# Patient Record
Sex: MALE | Race: BLACK OR AFRICAN AMERICAN | Employment: FULL TIME | URBAN - METROPOLITAN AREA
[De-identification: names, ages, dates, MRNs, and addresses within clinical notes are randomized per-mention and may not be internally consistent; named-entity substitution may affect disease eponyms.]

---

## 2021-01-19 PROCEDURE — 2709999900 HC NON-CHARGEABLE SUPPLY

## 2021-01-20 ENCOUNTER — APPOINTMENT (OUTPATIENT)
Dept: GENERAL RADIOLOGY | Age: 46
DRG: 638 | End: 2021-01-20
Attending: EMERGENCY MEDICINE
Payer: COMMERCIAL

## 2021-01-20 ENCOUNTER — HOSPITAL ENCOUNTER (INPATIENT)
Age: 46
LOS: 2 days | Discharge: HOME | DRG: 638 | End: 2021-01-23
Attending: EMERGENCY MEDICINE | Admitting: INTERNAL MEDICINE
Payer: COMMERCIAL

## 2021-01-20 DIAGNOSIS — K59.00 CONSTIPATION, UNSPECIFIED CONSTIPATION TYPE: ICD-10-CM

## 2021-01-20 DIAGNOSIS — R10.9 ABDOMINAL DISCOMFORT: ICD-10-CM

## 2021-01-20 DIAGNOSIS — E11.10 DIABETIC KETOACIDOSIS WITHOUT COMA ASSOCIATED WITH TYPE 2 DIABETES MELLITUS (HCC): ICD-10-CM

## 2021-01-20 DIAGNOSIS — E83.39 HYPOPHOSPHATEMIA: ICD-10-CM

## 2021-01-20 LAB
ARTERIAL PATENCY WRIST A: ABNORMAL
BASE DEFICIT BLDV-SCNC: 24 MMOL/L
BDY SITE: ABNORMAL
BODY TEMPERATURE: 98.3
GAS FLOW.O2 O2 DELIVERY SYS: ABNORMAL L/MIN
GLUCOSE BLD STRIP.AUTO-MCNC: 421 MG/DL (ref 70–110)
GLUCOSE BLD STRIP.AUTO-MCNC: 423 MG/DL (ref 70–110)
HCO3 BLDV-SCNC: 4.8 MMOL/L (ref 23–28)
LACTATE BLD-SCNC: 2.89 MMOL/L (ref 0.4–2)
PCO2 BLDV: 18.3 MMHG (ref 41–51)
PH BLDV: 7.03 [PH] (ref 7.32–7.42)
PO2 BLDV: 44 MMHG (ref 25–40)
SAO2 % BLDV: 60 % (ref 65–88)
SERVICE CMNT-IMP: ABNORMAL
SPECIMEN TYPE: ABNORMAL
TOTAL RESP. RATE, ITRR: 31

## 2021-01-20 PROCEDURE — 71045 X-RAY EXAM CHEST 1 VIEW: CPT

## 2021-01-20 PROCEDURE — 83735 ASSAY OF MAGNESIUM: CPT

## 2021-01-20 PROCEDURE — 80053 COMPREHEN METABOLIC PANEL: CPT

## 2021-01-20 PROCEDURE — 96375 TX/PRO/DX INJ NEW DRUG ADDON: CPT

## 2021-01-20 PROCEDURE — 99285 EMERGENCY DEPT VISIT HI MDM: CPT

## 2021-01-20 PROCEDURE — 84100 ASSAY OF PHOSPHORUS: CPT

## 2021-01-20 PROCEDURE — 82962 GLUCOSE BLOOD TEST: CPT

## 2021-01-20 PROCEDURE — 83605 ASSAY OF LACTIC ACID: CPT

## 2021-01-20 PROCEDURE — 74011250636 HC RX REV CODE- 250/636: Performed by: EMERGENCY MEDICINE

## 2021-01-20 PROCEDURE — 85025 COMPLETE CBC W/AUTO DIFF WBC: CPT

## 2021-01-20 PROCEDURE — 93005 ELECTROCARDIOGRAM TRACING: CPT

## 2021-01-20 PROCEDURE — 83036 HEMOGLOBIN GLYCOSYLATED A1C: CPT

## 2021-01-20 PROCEDURE — 87040 BLOOD CULTURE FOR BACTERIA: CPT

## 2021-01-20 PROCEDURE — 82803 BLOOD GASES ANY COMBINATION: CPT

## 2021-01-20 PROCEDURE — 96361 HYDRATE IV INFUSION ADD-ON: CPT

## 2021-01-20 RX ORDER — SODIUM CHLORIDE 0.9 % (FLUSH) 0.9 %
5-10 SYRINGE (ML) INJECTION AS NEEDED
Status: DISCONTINUED | OUTPATIENT
Start: 2021-01-20 | End: 2021-01-23 | Stop reason: HOSPADM

## 2021-01-20 RX ORDER — METFORMIN HYDROCHLORIDE 500 MG/1
500 TABLET ORAL 2 TIMES DAILY WITH MEALS
COMMUNITY
End: 2021-01-23

## 2021-01-20 RX ADMIN — SODIUM CHLORIDE 3000 ML: 900 INJECTION, SOLUTION INTRAVENOUS at 23:21

## 2021-01-21 ENCOUNTER — APPOINTMENT (OUTPATIENT)
Dept: GENERAL RADIOLOGY | Age: 46
DRG: 638 | End: 2021-01-21
Attending: PHYSICIAN ASSISTANT
Payer: COMMERCIAL

## 2021-01-21 ENCOUNTER — APPOINTMENT (OUTPATIENT)
Dept: ULTRASOUND IMAGING | Age: 46
DRG: 638 | End: 2021-01-21
Attending: PHYSICIAN ASSISTANT
Payer: COMMERCIAL

## 2021-01-21 PROBLEM — E11.10 DKA (DIABETIC KETOACIDOSES): Status: ACTIVE | Noted: 2021-01-21

## 2021-01-21 LAB
ADMINISTERED INITIALS, ADMINIT: NORMAL
ALBUMIN SERPL-MCNC: 3.2 G/DL (ref 3.4–5)
ALBUMIN SERPL-MCNC: 3.3 G/DL (ref 3.4–5)
ALBUMIN SERPL-MCNC: 4.4 G/DL (ref 3.4–5)
ALBUMIN/GLOB SERPL: 0.8 {RATIO} (ref 0.8–1.7)
ALP SERPL-CCNC: 95 U/L (ref 45–117)
ALT SERPL-CCNC: 22 U/L (ref 16–61)
AMORPH CRY URNS QL MICRO: ABNORMAL
AMYLASE SERPL-CCNC: 41 U/L (ref 25–115)
ANION GAP SERPL CALC-SCNC: 13 MMOL/L (ref 3–18)
ANION GAP SERPL CALC-SCNC: 14 MMOL/L (ref 3–18)
ANION GAP SERPL CALC-SCNC: 24 MMOL/L (ref 3–18)
ANION GAP SERPL CALC-SCNC: 27 MMOL/L (ref 3–18)
APPEARANCE UR: CLEAR
ARTERIAL PATENCY WRIST A: ABNORMAL
AST SERPL-CCNC: 17 U/L (ref 10–38)
ATRIAL RATE: 125 BPM
B PERT DNA SPEC QL NAA+PROBE: NOT DETECTED
BACTERIA URNS QL MICRO: NEGATIVE /HPF
BASE DEFICIT BLDV-SCNC: 27 MMOL/L
BASOPHILS # BLD: 0 K/UL (ref 0–0.06)
BASOPHILS # BLD: 0 K/UL (ref 0–0.1)
BASOPHILS NFR BLD: 0 % (ref 0–3)
BASOPHILS NFR BLD: 0 % (ref 0–3)
BDY SITE: ABNORMAL
BILIRUB SERPL-MCNC: 1 MG/DL (ref 0.2–1)
BILIRUB UR QL: NEGATIVE
BODY TEMPERATURE: 97.8
BORDETELLA PARAPERTUSSIS PCR, BORPAR: NOT DETECTED
BUN SERPL-MCNC: 10 MG/DL (ref 7–18)
BUN SERPL-MCNC: 16 MG/DL (ref 7–18)
BUN SERPL-MCNC: 18 MG/DL (ref 7–18)
BUN SERPL-MCNC: 7 MG/DL (ref 7–18)
BUN/CREAT SERPL: 10 (ref 12–20)
BUN/CREAT SERPL: 11 (ref 12–20)
BUN/CREAT SERPL: 5 (ref 12–20)
BUN/CREAT SERPL: 8 (ref 12–20)
C PNEUM DNA SPEC QL NAA+PROBE: NOT DETECTED
CALCIUM SERPL-MCNC: 7.5 MG/DL (ref 8.5–10.1)
CALCIUM SERPL-MCNC: 7.5 MG/DL (ref 8.5–10.1)
CALCIUM SERPL-MCNC: 7.6 MG/DL (ref 8.5–10.1)
CALCIUM SERPL-MCNC: 9.1 MG/DL (ref 8.5–10.1)
CALCULATED P AXIS, ECG09: 57 DEGREES
CALCULATED R AXIS, ECG10: 1 DEGREES
CALCULATED T AXIS, ECG11: 59 DEGREES
CHLORIDE SERPL-SCNC: 104 MMOL/L (ref 100–111)
CHLORIDE SERPL-SCNC: 107 MMOL/L (ref 100–111)
CHLORIDE SERPL-SCNC: 111 MMOL/L (ref 100–111)
CHLORIDE SERPL-SCNC: 91 MMOL/L (ref 100–111)
CK MB CFR SERPL CALC: 0.4 % (ref 0–4)
CK MB SERPL-MCNC: 1.3 NG/ML (ref 5–25)
CK SERPL-CCNC: 294 U/L (ref 39–308)
CO2 SERPL-SCNC: 11 MMOL/L (ref 21–32)
CO2 SERPL-SCNC: 16 MMOL/L (ref 21–32)
CO2 SERPL-SCNC: 6 MMOL/L (ref 21–32)
CO2 SERPL-SCNC: 7 MMOL/L (ref 21–32)
COLOR UR: YELLOW
CREAT SERPL-MCNC: 1.29 MG/DL (ref 0.6–1.3)
CREAT SERPL-MCNC: 1.31 MG/DL (ref 0.6–1.3)
CREAT SERPL-MCNC: 1.45 MG/DL (ref 0.6–1.3)
CREAT SERPL-MCNC: 1.86 MG/DL (ref 0.6–1.3)
D50 ADMINISTERED, D50ADM: 0 ML
D50 ORDER, D50ORD: 0 ML
DIAGNOSIS, 93000: NORMAL
DIFFERENTIAL METHOD BLD: ABNORMAL
DIFFERENTIAL METHOD BLD: ABNORMAL
EOSINOPHIL # BLD: 0 K/UL (ref 0–0.4)
EOSINOPHIL # BLD: 0 K/UL (ref 0–0.4)
EOSINOPHIL NFR BLD: 0 % (ref 0–5)
EOSINOPHIL NFR BLD: 0 % (ref 0–5)
EPITH CASTS URNS QL MICRO: ABNORMAL /LPF (ref 0–5)
ERYTHROCYTE [DISTWIDTH] IN BLOOD BY AUTOMATED COUNT: 14.3 % (ref 11.6–14.5)
ERYTHROCYTE [DISTWIDTH] IN BLOOD BY AUTOMATED COUNT: 15.1 % (ref 11.6–14.5)
EST. AVERAGE GLUCOSE BLD GHB EST-MCNC: ABNORMAL MG/DL
FLUAV SUBTYP SPEC NAA+PROBE: NOT DETECTED
FLUBV RNA SPEC QL NAA+PROBE: NOT DETECTED
GAS FLOW.O2 O2 DELIVERY SYS: ABNORMAL L/MIN
GLOBULIN SER CALC-MCNC: 5.7 G/DL (ref 2–4)
GLUCOSE BLD STRIP.AUTO-MCNC: 124 MG/DL (ref 70–110)
GLUCOSE BLD STRIP.AUTO-MCNC: 158 MG/DL (ref 70–110)
GLUCOSE BLD STRIP.AUTO-MCNC: 166 MG/DL (ref 70–110)
GLUCOSE BLD STRIP.AUTO-MCNC: 170 MG/DL (ref 70–110)
GLUCOSE BLD STRIP.AUTO-MCNC: 170 MG/DL (ref 70–110)
GLUCOSE BLD STRIP.AUTO-MCNC: 171 MG/DL (ref 70–110)
GLUCOSE BLD STRIP.AUTO-MCNC: 172 MG/DL (ref 70–110)
GLUCOSE BLD STRIP.AUTO-MCNC: 173 MG/DL (ref 70–110)
GLUCOSE BLD STRIP.AUTO-MCNC: 174 MG/DL (ref 70–110)
GLUCOSE BLD STRIP.AUTO-MCNC: 178 MG/DL (ref 70–110)
GLUCOSE BLD STRIP.AUTO-MCNC: 180 MG/DL (ref 70–110)
GLUCOSE BLD STRIP.AUTO-MCNC: 187 MG/DL (ref 70–110)
GLUCOSE BLD STRIP.AUTO-MCNC: 194 MG/DL (ref 70–110)
GLUCOSE BLD STRIP.AUTO-MCNC: 198 MG/DL (ref 70–110)
GLUCOSE BLD STRIP.AUTO-MCNC: 204 MG/DL (ref 70–110)
GLUCOSE BLD STRIP.AUTO-MCNC: 210 MG/DL (ref 70–110)
GLUCOSE BLD STRIP.AUTO-MCNC: 227 MG/DL (ref 70–110)
GLUCOSE BLD STRIP.AUTO-MCNC: 282 MG/DL (ref 70–110)
GLUCOSE BLD STRIP.AUTO-MCNC: 336 MG/DL (ref 70–110)
GLUCOSE BLD STRIP.AUTO-MCNC: 387 MG/DL (ref 70–110)
GLUCOSE SERPL-MCNC: 135 MG/DL (ref 74–99)
GLUCOSE SERPL-MCNC: 158 MG/DL (ref 74–99)
GLUCOSE SERPL-MCNC: 245 MG/DL (ref 74–99)
GLUCOSE SERPL-MCNC: 404 MG/DL (ref 74–99)
GLUCOSE UR STRIP.AUTO-MCNC: >1000 MG/DL
GLUCOSE, GLC: 124 MG/DL
GLUCOSE, GLC: 137 MG/DL
GLUCOSE, GLC: 158 MG/DL
GLUCOSE, GLC: 166 MG/DL
GLUCOSE, GLC: 170 MG/DL
GLUCOSE, GLC: 170 MG/DL
GLUCOSE, GLC: 171 MG/DL
GLUCOSE, GLC: 172 MG/DL
GLUCOSE, GLC: 173 MG/DL
GLUCOSE, GLC: 174 MG/DL
GLUCOSE, GLC: 178 MG/DL
GLUCOSE, GLC: 180 MG/DL
GLUCOSE, GLC: 187 MG/DL
GLUCOSE, GLC: 194 MG/DL
GLUCOSE, GLC: 204 MG/DL
GLUCOSE, GLC: 210 MG/DL
GLUCOSE, GLC: 227 MG/DL
GLUCOSE, GLC: 282 MG/DL
GLUCOSE, GLC: 336 MG/DL
GLUCOSE, GLC: 387 MG/DL
GRAN CASTS URNS QL MICRO: ABNORMAL /LPF
HADV DNA SPEC QL NAA+PROBE: NOT DETECTED
HBA1C MFR BLD: >14 % (ref 4.2–5.6)
HCO3 BLDV-SCNC: 3.4 MMOL/L (ref 23–28)
HCOV 229E RNA SPEC QL NAA+PROBE: NOT DETECTED
HCOV HKU1 RNA SPEC QL NAA+PROBE: NOT DETECTED
HCOV NL63 RNA SPEC QL NAA+PROBE: NOT DETECTED
HCOV OC43 RNA SPEC QL NAA+PROBE: NOT DETECTED
HCT VFR BLD AUTO: 38.5 % (ref 36–48)
HCT VFR BLD AUTO: 48.1 % (ref 36–48)
HGB BLD-MCNC: 14.2 G/DL (ref 13–16)
HGB BLD-MCNC: 17.4 G/DL (ref 13–16)
HGB UR QL STRIP: ABNORMAL
HIGH TARGET, HITG: 180 MG/DL
HMPV RNA SPEC QL NAA+PROBE: NOT DETECTED
HPIV1 RNA SPEC QL NAA+PROBE: NOT DETECTED
HPIV2 RNA SPEC QL NAA+PROBE: NOT DETECTED
HPIV3 RNA SPEC QL NAA+PROBE: NOT DETECTED
HPIV4 RNA SPEC QL NAA+PROBE: NOT DETECTED
HYALINE CASTS URNS QL MICRO: ABNORMAL /LPF (ref 0–2)
INSULIN ADMINSTERED, INSADM: 1.9 UNITS/HOUR
INSULIN ADMINSTERED, INSADM: 2.2 UNITS/HOUR
INSULIN ADMINSTERED, INSADM: 2.2 UNITS/HOUR
INSULIN ADMINSTERED, INSADM: 2.9 UNITS/HOUR
INSULIN ADMINSTERED, INSADM: 3.1 UNITS/HOUR
INSULIN ADMINSTERED, INSADM: 3.4 UNITS/HOUR
INSULIN ADMINSTERED, INSADM: 3.4 UNITS/HOUR
INSULIN ADMINSTERED, INSADM: 4.2 UNITS/HOUR
INSULIN ADMINSTERED, INSADM: 4.4 UNITS/HOUR
INSULIN ADMINSTERED, INSADM: 4.8 UNITS/HOUR
INSULIN ADMINSTERED, INSADM: 5 UNITS/HOUR
INSULIN ADMINSTERED, INSADM: 5.6 UNITS/HOUR
INSULIN ADMINSTERED, INSADM: 5.8 UNITS/HOUR
INSULIN ADMINSTERED, INSADM: 6.5 UNITS/HOUR
INSULIN ADMINSTERED, INSADM: 6.7 UNITS/HOUR
INSULIN ADMINSTERED, INSADM: 7.1 UNITS/HOUR
INSULIN ADMINSTERED, INSADM: 7.5 UNITS/HOUR
INSULIN ADMINSTERED, INSADM: 7.6 UNITS/HOUR
INSULIN ADMINSTERED, INSADM: 8.3 UNITS/HOUR
INSULIN ADMINSTERED, INSADM: 8.9 UNITS/HOUR
INSULIN ORDER, INSORD: 1.9 UNITS/HOUR
INSULIN ORDER, INSORD: 2.2 UNITS/HOUR
INSULIN ORDER, INSORD: 2.2 UNITS/HOUR
INSULIN ORDER, INSORD: 2.9 UNITS/HOUR
INSULIN ORDER, INSORD: 3.1 UNITS/HOUR
INSULIN ORDER, INSORD: 3.4 UNITS/HOUR
INSULIN ORDER, INSORD: 3.4 UNITS/HOUR
INSULIN ORDER, INSORD: 4.2 UNITS/HOUR
INSULIN ORDER, INSORD: 4.4 UNITS/HOUR
INSULIN ORDER, INSORD: 4.8 UNITS/HOUR
INSULIN ORDER, INSORD: 5 UNITS/HOUR
INSULIN ORDER, INSORD: 5.6 UNITS/HOUR
INSULIN ORDER, INSORD: 5.8 UNITS/HOUR
INSULIN ORDER, INSORD: 6.5 UNITS/HOUR
INSULIN ORDER, INSORD: 6.7 UNITS/HOUR
INSULIN ORDER, INSORD: 7.1 UNITS/HOUR
INSULIN ORDER, INSORD: 7.5 UNITS/HOUR
INSULIN ORDER, INSORD: 7.6 UNITS/HOUR
INSULIN ORDER, INSORD: 8.3 UNITS/HOUR
INSULIN ORDER, INSORD: 8.9 UNITS/HOUR
KETONES UR QL STRIP.AUTO: >160 MG/DL
LACTATE BLD-SCNC: 1.18 MMOL/L (ref 0.4–2)
LACTATE BLD-SCNC: 2.01 MMOL/L (ref 0.4–2)
LACTATE SERPL-SCNC: 1.2 MMOL/L (ref 0.4–2)
LEUKOCYTE ESTERASE UR QL STRIP.AUTO: NEGATIVE
LIPASE SERPL-CCNC: 183 U/L (ref 73–393)
LOW TARGET, LOT: 140 MG/DL
LYMPHOCYTES # BLD: 1 K/UL (ref 0.8–3.5)
LYMPHOCYTES # BLD: 2.3 K/UL (ref 0.8–3.5)
LYMPHOCYTES NFR BLD: 20 % (ref 20–51)
LYMPHOCYTES NFR BLD: 5 % (ref 20–51)
M PNEUMO DNA SPEC QL NAA+PROBE: NOT DETECTED
MAGNESIUM SERPL-MCNC: 1.7 MG/DL (ref 1.6–2.6)
MAGNESIUM SERPL-MCNC: 1.8 MG/DL (ref 1.6–2.6)
MAGNESIUM SERPL-MCNC: 2.4 MG/DL (ref 1.6–2.6)
MAGNESIUM SERPL-MCNC: 2.4 MG/DL (ref 1.6–2.6)
MCH RBC QN AUTO: 26.7 PG (ref 24–34)
MCH RBC QN AUTO: 27 PG (ref 24–34)
MCHC RBC AUTO-ENTMCNC: 36.2 G/DL (ref 31–37)
MCHC RBC AUTO-ENTMCNC: 36.9 G/DL (ref 31–37)
MCV RBC AUTO: 73.2 FL (ref 74–97)
MCV RBC AUTO: 73.8 FL (ref 74–97)
METAMYELOCYTES NFR BLD MANUAL: 1 %
MINUTES UNTIL NEXT BG, NBG: 60 MIN
MONOCYTES # BLD: 0.4 K/UL (ref 0–1)
MONOCYTES # BLD: 0.7 K/UL (ref 0–1)
MONOCYTES NFR BLD: 2 % (ref 2–9)
MONOCYTES NFR BLD: 6 % (ref 2–9)
MUCOUS THREADS URNS QL MICRO: ABNORMAL /LPF
MULTIPLIER, MUL: 0.02
MULTIPLIER, MUL: 0.03
MULTIPLIER, MUL: 0.04
MULTIPLIER, MUL: 0.05
MULTIPLIER, MUL: 0.06
MULTIPLIER, MUL: 0.06
NEUTS BAND NFR BLD MANUAL: 5 % (ref 0–5)
NEUTS BAND NFR BLD MANUAL: 7 % (ref 0–5)
NEUTS SEG # BLD: 17.4 K/UL (ref 1.8–8)
NEUTS SEG # BLD: 8.3 K/UL (ref 1.8–8)
NEUTS SEG NFR BLD: 69 % (ref 42–75)
NEUTS SEG NFR BLD: 85 % (ref 42–75)
NITRITE UR QL STRIP.AUTO: NEGATIVE
O2/TOTAL GAS SETTING VFR VENT: 99 %
ORDER INITIALS, ORDINIT: NORMAL
P-R INTERVAL, ECG05: 128 MS
PCO2 BLDV: 14.1 MMHG (ref 41–51)
PH BLDV: 6.98 [PH] (ref 7.32–7.42)
PH UR STRIP: 5 [PH] (ref 5–8)
PHOSPHATE SERPL-MCNC: 1.4 MG/DL (ref 2.5–4.9)
PHOSPHATE SERPL-MCNC: 1.6 MG/DL (ref 2.5–4.9)
PHOSPHATE SERPL-MCNC: 5.9 MG/DL (ref 2.5–4.9)
PLATELET # BLD AUTO: 148 K/UL (ref 135–420)
PLATELET # BLD AUTO: 191 K/UL (ref 135–420)
PLATELET COMMENTS,PCOM: ABNORMAL
PLATELET COMMENTS,PCOM: ABNORMAL
PO2 BLDV: 65 MMHG (ref 25–40)
POTASSIUM SERPL-SCNC: 3.5 MMOL/L (ref 3.5–5.5)
POTASSIUM SERPL-SCNC: 3.9 MMOL/L (ref 3.5–5.5)
POTASSIUM SERPL-SCNC: 4.7 MMOL/L (ref 3.5–5.5)
POTASSIUM SERPL-SCNC: 4.7 MMOL/L (ref 3.5–5.5)
PROCALCITONIN SERPL-MCNC: 0.18 NG/ML
PROT SERPL-MCNC: 10.1 G/DL (ref 6.4–8.2)
PROT UR STRIP-MCNC: 100 MG/DL
Q-T INTERVAL, ECG07: 344 MS
QRS DURATION, ECG06: 86 MS
QTC CALCULATION (BEZET), ECG08: 496 MS
RBC # BLD AUTO: 5.26 M/UL (ref 4.7–5.5)
RBC # BLD AUTO: 6.52 M/UL (ref 4.7–5.5)
RBC #/AREA URNS HPF: ABNORMAL /HPF (ref 0–5)
RBC MORPH BLD: ABNORMAL
RSV RNA SPEC QL NAA+PROBE: NOT DETECTED
RV+EV RNA SPEC QL NAA+PROBE: NOT DETECTED
SAO2 % BLDV: 80 % (ref 65–88)
SARS-COV-2 PCR, COVPCR: NOT DETECTED
SERVICE CMNT-IMP: ABNORMAL
SODIUM SERPL-SCNC: 125 MMOL/L (ref 136–145)
SODIUM SERPL-SCNC: 134 MMOL/L (ref 136–145)
SODIUM SERPL-SCNC: 136 MMOL/L (ref 136–145)
SODIUM SERPL-SCNC: 136 MMOL/L (ref 136–145)
SP GR UR REFRACTOMETRY: 1.03 (ref 1–1.03)
SPECIMEN TYPE: ABNORMAL
TOTAL RESP. RATE, ITRR: 39
TROPONIN I SERPL-MCNC: <0.02 NG/ML (ref 0–0.04)
UROBILINOGEN UR QL STRIP.AUTO: 0.2 EU/DL (ref 0.2–1)
VENTRICULAR RATE, ECG03: 125 BPM
WBC # BLD AUTO: 11.3 K/UL (ref 4.6–13.2)
WBC # BLD AUTO: 20.5 K/UL (ref 4.6–13.2)
WBC URNS QL MICRO: ABNORMAL /HPF (ref 0–4)

## 2021-01-21 PROCEDURE — 83735 ASSAY OF MAGNESIUM: CPT

## 2021-01-21 PROCEDURE — 82962 GLUCOSE BLOOD TEST: CPT

## 2021-01-21 PROCEDURE — 77030040392 HC DRSG OPTIFOAM MDII -A

## 2021-01-21 PROCEDURE — 85025 COMPLETE CBC W/AUTO DIFF WBC: CPT

## 2021-01-21 PROCEDURE — 86341 ISLET CELL ANTIBODY: CPT

## 2021-01-21 PROCEDURE — 99232 SBSQ HOSP IP/OBS MODERATE 35: CPT | Performed by: HOSPITALIST

## 2021-01-21 PROCEDURE — 36415 COLL VENOUS BLD VENIPUNCTURE: CPT

## 2021-01-21 PROCEDURE — 74011000250 HC RX REV CODE- 250: Performed by: EMERGENCY MEDICINE

## 2021-01-21 PROCEDURE — 83605 ASSAY OF LACTIC ACID: CPT

## 2021-01-21 PROCEDURE — 76705 ECHO EXAM OF ABDOMEN: CPT

## 2021-01-21 PROCEDURE — 74011000250 HC RX REV CODE- 250: Performed by: INTERNAL MEDICINE

## 2021-01-21 PROCEDURE — 65660000004 HC RM CVT STEPDOWN

## 2021-01-21 PROCEDURE — 96361 HYDRATE IV INFUSION ADD-ON: CPT

## 2021-01-21 PROCEDURE — 2709999900 HC NON-CHARGEABLE SUPPLY

## 2021-01-21 PROCEDURE — 83690 ASSAY OF LIPASE: CPT

## 2021-01-21 PROCEDURE — 74018 RADEX ABDOMEN 1 VIEW: CPT

## 2021-01-21 PROCEDURE — 74011250636 HC RX REV CODE- 250/636: Performed by: INTERNAL MEDICINE

## 2021-01-21 PROCEDURE — 86337 INSULIN ANTIBODIES: CPT

## 2021-01-21 PROCEDURE — 80048 BASIC METABOLIC PNL TOTAL CA: CPT

## 2021-01-21 PROCEDURE — 74011636637 HC RX REV CODE- 636/637: Performed by: EMERGENCY MEDICINE

## 2021-01-21 PROCEDURE — 74011250636 HC RX REV CODE- 250/636: Performed by: EMERGENCY MEDICINE

## 2021-01-21 PROCEDURE — 74011000258 HC RX REV CODE- 258: Performed by: EMERGENCY MEDICINE

## 2021-01-21 PROCEDURE — 74011000258 HC RX REV CODE- 258: Performed by: NURSE PRACTITIONER

## 2021-01-21 PROCEDURE — 74011000250 HC RX REV CODE- 250: Performed by: PHYSICIAN ASSISTANT

## 2021-01-21 PROCEDURE — 80069 RENAL FUNCTION PANEL: CPT

## 2021-01-21 PROCEDURE — 81001 URINALYSIS AUTO W/SCOPE: CPT

## 2021-01-21 PROCEDURE — 74011000258 HC RX REV CODE- 258: Performed by: PHYSICIAN ASSISTANT

## 2021-01-21 PROCEDURE — 96375 TX/PRO/DX INJ NEW DRUG ADDON: CPT

## 2021-01-21 PROCEDURE — 87426 SARSCOV CORONAVIRUS AG IA: CPT

## 2021-01-21 PROCEDURE — 74011250637 HC RX REV CODE- 250/637: Performed by: STUDENT IN AN ORGANIZED HEALTH CARE EDUCATION/TRAINING PROGRAM

## 2021-01-21 PROCEDURE — 74011250636 HC RX REV CODE- 250/636: Performed by: NURSE PRACTITIONER

## 2021-01-21 PROCEDURE — 82553 CREATINE MB FRACTION: CPT

## 2021-01-21 PROCEDURE — 96367 TX/PROPH/DG ADDL SEQ IV INF: CPT

## 2021-01-21 PROCEDURE — 99291 CRITICAL CARE FIRST HOUR: CPT | Performed by: INTERNAL MEDICINE

## 2021-01-21 PROCEDURE — 82803 BLOOD GASES ANY COMBINATION: CPT

## 2021-01-21 PROCEDURE — 82150 ASSAY OF AMYLASE: CPT

## 2021-01-21 PROCEDURE — 96365 THER/PROPH/DIAG IV INF INIT: CPT

## 2021-01-21 PROCEDURE — 84145 PROCALCITONIN (PCT): CPT

## 2021-01-21 RX ORDER — MAGNESIUM SULFATE 100 %
4 CRYSTALS MISCELLANEOUS AS NEEDED
Status: DISCONTINUED | OUTPATIENT
Start: 2021-01-21 | End: 2021-01-22

## 2021-01-21 RX ORDER — ONDANSETRON 2 MG/ML
4 INJECTION INTRAMUSCULAR; INTRAVENOUS
Status: DISCONTINUED | OUTPATIENT
Start: 2021-01-21 | End: 2021-01-23 | Stop reason: HOSPADM

## 2021-01-21 RX ORDER — MAGNESIUM SULFATE HEPTAHYDRATE 40 MG/ML
2 INJECTION, SOLUTION INTRAVENOUS ONCE
Status: COMPLETED | OUTPATIENT
Start: 2021-01-21 | End: 2021-01-21

## 2021-01-21 RX ORDER — MORPHINE SULFATE 2 MG/ML
1 INJECTION, SOLUTION INTRAMUSCULAR; INTRAVENOUS
Status: DISCONTINUED | OUTPATIENT
Start: 2021-01-21 | End: 2021-01-22

## 2021-01-21 RX ORDER — ONDANSETRON 2 MG/ML
4 INJECTION INTRAMUSCULAR; INTRAVENOUS
Status: COMPLETED | OUTPATIENT
Start: 2021-01-21 | End: 2021-01-21

## 2021-01-21 RX ORDER — ACETAMINOPHEN 325 MG/1
650 TABLET ORAL
Status: DISCONTINUED | OUTPATIENT
Start: 2021-01-21 | End: 2021-01-23 | Stop reason: HOSPADM

## 2021-01-21 RX ORDER — MORPHINE SULFATE 2 MG/ML
2 INJECTION, SOLUTION INTRAMUSCULAR; INTRAVENOUS
Status: DISCONTINUED | OUTPATIENT
Start: 2021-01-21 | End: 2021-01-23 | Stop reason: HOSPADM

## 2021-01-21 RX ORDER — DEXTROSE MONOHYDRATE AND SODIUM CHLORIDE 5; .45 G/100ML; G/100ML
75 INJECTION, SOLUTION INTRAVENOUS CONTINUOUS
Status: DISCONTINUED | OUTPATIENT
Start: 2021-01-21 | End: 2021-01-23

## 2021-01-21 RX ORDER — ENOXAPARIN SODIUM 100 MG/ML
40 INJECTION SUBCUTANEOUS EVERY 24 HOURS
Status: DISCONTINUED | OUTPATIENT
Start: 2021-01-21 | End: 2021-01-23 | Stop reason: HOSPADM

## 2021-01-21 RX ORDER — SODIUM BICARBONATE 1 MEQ/ML
50 SYRINGE (ML) INTRAVENOUS
Status: COMPLETED | OUTPATIENT
Start: 2021-01-21 | End: 2021-01-21

## 2021-01-21 RX ORDER — MORPHINE SULFATE 4 MG/ML
4 INJECTION, SOLUTION INTRAMUSCULAR; INTRAVENOUS
Status: COMPLETED | OUTPATIENT
Start: 2021-01-21 | End: 2021-01-21

## 2021-01-21 RX ORDER — DEXTROSE 50 % IN WATER (D50W) INTRAVENOUS SYRINGE
25-50 AS NEEDED
Status: DISCONTINUED | OUTPATIENT
Start: 2021-01-21 | End: 2021-01-22

## 2021-01-21 RX ORDER — FAMOTIDINE 20 MG/1
20 TABLET, FILM COATED ORAL DAILY
Status: DISCONTINUED | OUTPATIENT
Start: 2021-01-22 | End: 2021-01-23 | Stop reason: HOSPADM

## 2021-01-21 RX ORDER — SODIUM BICARBONATE 1 MEQ/ML
50 SYRINGE (ML) INTRAVENOUS ONCE
Status: COMPLETED | OUTPATIENT
Start: 2021-01-21 | End: 2021-01-21

## 2021-01-21 RX ORDER — VANCOMYCIN HYDROCHLORIDE
1250
Status: DISCONTINUED | OUTPATIENT
Start: 2021-01-21 | End: 2021-01-22

## 2021-01-21 RX ADMIN — SODIUM CHLORIDE 6.5 UNITS/HR: 0.9 INJECTION INTRAVENOUS at 01:05

## 2021-01-21 RX ADMIN — MORPHINE SULFATE 2 MG: 2 INJECTION, SOLUTION INTRAMUSCULAR; INTRAVENOUS at 19:12

## 2021-01-21 RX ADMIN — VANCOMYCIN HYDROCHLORIDE 1000 MG: 1 INJECTION, POWDER, LYOPHILIZED, FOR SOLUTION INTRAVENOUS at 04:29

## 2021-01-21 RX ADMIN — SODIUM CHLORIDE, SODIUM ACETATE ANHYDROUS, SODIUM GLUCONATE, POTASSIUM CHLORIDE, AND MAGNESIUM CHLORIDE 1000 ML: 526; 222; 502; 37; 30 INJECTION, SOLUTION INTRAVENOUS at 18:13

## 2021-01-21 RX ADMIN — DEXTROSE MONOHYDRATE AND SODIUM CHLORIDE 150 ML/HR: 5; .45 INJECTION, SOLUTION INTRAVENOUS at 15:50

## 2021-01-21 RX ADMIN — ONDANSETRON 4 MG: 2 INJECTION INTRAMUSCULAR; INTRAVENOUS at 19:12

## 2021-01-21 RX ADMIN — ENOXAPARIN SODIUM 40 MG: 40 INJECTION SUBCUTANEOUS at 14:05

## 2021-01-21 RX ADMIN — DEXTROSE MONOHYDRATE AND SODIUM CHLORIDE 150 ML/HR: 5; .45 INJECTION, SOLUTION INTRAVENOUS at 05:05

## 2021-01-21 RX ADMIN — SODIUM PHOSPHATE, MONOBASIC, MONOHYDRATE 9 MMOL: 276; 142 INJECTION, SOLUTION INTRAVENOUS at 16:29

## 2021-01-21 RX ADMIN — PIPERACILLIN AND TAZOBACTAM 3.38 G: 3; .375 INJECTION, POWDER, LYOPHILIZED, FOR SOLUTION INTRAVENOUS at 19:20

## 2021-01-21 RX ADMIN — MAGNESIUM SULFATE HEPTAHYDRATE 2 G: 2 INJECTION, SOLUTION INTRAVENOUS at 05:18

## 2021-01-21 RX ADMIN — DEXTROSE MONOHYDRATE AND SODIUM CHLORIDE 150 ML/HR: 5; .45 INJECTION, SOLUTION INTRAVENOUS at 20:00

## 2021-01-21 RX ADMIN — MORPHINE SULFATE 4 MG: 4 INJECTION, SOLUTION INTRAMUSCULAR; INTRAVENOUS at 02:33

## 2021-01-21 RX ADMIN — ONDANSETRON 4 MG: 2 INJECTION INTRAMUSCULAR; INTRAVENOUS at 02:33

## 2021-01-21 RX ADMIN — PIPERACILLIN SODIUM AND TAZOBACTAM SODIUM 4.5 G: 4; .5 INJECTION, POWDER, LYOPHILIZED, FOR SOLUTION INTRAVENOUS at 02:39

## 2021-01-21 RX ADMIN — SODIUM CHLORIDE 5.6 UNITS/HR: 0.9 INJECTION INTRAVENOUS at 18:05

## 2021-01-21 RX ADMIN — SODIUM BICARBONATE 50 MEQ: 84 INJECTION, SOLUTION INTRAVENOUS at 03:45

## 2021-01-21 RX ADMIN — MAGNESIUM SULFATE HEPTAHYDRATE 2 G: 40 INJECTION, SOLUTION INTRAVENOUS at 03:24

## 2021-01-21 RX ADMIN — SODIUM CHLORIDE 1000 ML: 900 INJECTION, SOLUTION INTRAVENOUS at 02:16

## 2021-01-21 RX ADMIN — ACETAMINOPHEN 650 MG: 325 TABLET ORAL at 23:37

## 2021-01-21 RX ADMIN — Medication 50 MEQ: at 09:45

## 2021-01-21 NOTE — ED NOTES
Pt reports that yesterday he went to see his primary care doctor for evaluation of frequent urination since December 2020. States voids 6- 8 times during the day and six times at night. Pt was seen and prescribed Metformin PO for new onset DM. Pt state she took two doses and today began having upper abdominal pain and nausea/ vomiting. Denies fever. Denies diarrhea. Denies cough/ chills. Pt arrives awake, but appears generally ill with tachypnea and Kussmaul respirations at 38 per minute. Pt is oriented x 4. Keeps eyes closed unless speaking to him. Pt is cooperative calm. Follows commands. Moves all extremities purposefully. Skin warm/ dry. Abdomen is distended, firm, mildly tender to upper left quadrant. Pt uses urinal without difficulty. Side rails up x2. Call bell in reach.

## 2021-01-21 NOTE — PROGRESS NOTES
attended the interdisciplinary rounds for [facility], who is a 39 y.o.,male. Patients Primary Language is: [employer]. According to the patients EMR Restorationism Affiliation is: Unknown. The reason the Patient came to the hospital is:   Patient Active Problem List    Diagnosis Date Noted    DKA (diabetic ketoacidoses) ([facility] [address] 01/21/2021          Plan:  Moncho Oh will continue to follow and will provide pastoral care on an as needed/requested basis.  recommends bedside caregivers page  on duty if patient shows signs of acute spiritual or emotional distress.     [address]  Board Certified [address] [facility] [place] [facility]   [phone]

## 2021-01-21 NOTE — PROGRESS NOTES
0600: TRANSFER - IN REPORT:    Verbal report received from Layton Gordillo RN (name) on Marlen Richter  being received from [facility] ED(unit) for routine progression of care      Report consisted of patients Situation, Background, Assessment and   Recommendations(SBAR). Information from the following report(s) ED Summary, Intake/Output, MAR, Recent Results and Cardiac Rhythm ST was reviewed with the receiving nurse. Opportunity for questions and clarification was provided. 9948: Patient arrived to ICU with medical transport. Moved self over to bed and attached to monitors. Used urinal to void without difficulty. Drips verified. Patient awake and oriented, following commands, denies pain. Patient just wants something to drink but NPO at present and updated. Orders reviewed. 0700: Central monitoring system is down and unable to print EKG strip at this time. 0720: Bedside and Verbal shift change report given to Obed Rojas RN (oncoming nurse) by Tammi Stover RN (offgoing nurse). Report included the following information SBAR, Kardex, MAR and Recent Results. SITUATION:    Code Status: No Order   Reason for Admission: DKA (diabetic ketoacidoses) ([facility] [address] [E11.10]    [facility] day: 0   Problem List:       Hospital Problems  Never Reviewed          Codes Class Noted POA    DKA (diabetic ketoacidoses) ([facility] [address] ICD-10-CM: E11.10  ICD-9-CM: 250.12  1/21/2021 Unknown              BACKGROUND:    Past Medical History: No past medical history on file. Patient taking anticoagulants no     ASSESSMENT:    Changes in Assessment Throughout Shift: Just arrived as admission from [facility] ED at 1359 this morning.      Patient has Central Line: no Reasons if yes: n/a   Patient has Munoz Cath: no Reasons if yes: n/a      Last Vitals:     Vitals:    01/21/21 0515 01/21/21 0545 01/21/21 0615 01/21/21 0709   BP: (!) 145/89 (!) 149/87 (!) 149/82 (!) 166/92   Pulse: (!) 119 (!) 116 (!) 115 (!) 115   Resp: 30 28 (!) 31 28   Temp: 98 °F (36.7 °C)  98.1 °F (36.7 °C) 98.8 °F (37.1 °C)   SpO2: 100% 100% 100% 100%   Weight:    102.7 kg (226 lb 6.6 oz)   Height:    5' 7\" (1.702 m)        IV and DRAINS (will only show if present)   Peripheral IV 01/20/21 Left Antecubital-Site Assessment: Clean, dry, & intact  Peripheral IV 01/20/21 Right Antecubital-Site Assessment: Clean, dry, & intact     WOUND (if present)   Wound Type:  none   Dressing present     Wound Concerns/Notes:  none     PAIN    Pain Assessment    Pain Intensity 1: 0 (01/21/21 0709)              Patient Stated Pain Goal: 0  o Interventions for Pain:  none  o Intervention effective: n/a  o Time of last intervention: n/a   o Reassessment Completed: n/a      Last 3 Weights:  Last 3 Recorded Weights in this Encounter    01/21/21 0256 01/21/21 0709   Weight: 99.8 kg (220 lb) 102.7 kg (226 lb 6.6 oz)     Weight change:      INTAKE/OUPUT    Current Shift: 01/21 0701 - 01/21 1900  In: 238.7 [I.V.:238.7]  Out: 0     Last three shifts: 01/19 1901 - 01/21 0700  In: 4400 [I.V.:4400]  Out: 1850 [Urine:1850]     LAB RESULTS     Recent Labs     01/20/21  2327   WBC 20.5*   HGB 17.4*   HCT 48.1*           Recent Labs     01/21/21  0333 01/20/21  2327   * 125*   K 4.7 4.7   * 404*   BUN 16 18   CREA 1.45* 1.86*   CA 7.6* 9.1   MG 1.7 1.8       RECOMMENDATIONS AND DISCHARGE PLANNING     1. Pending tests/procedures/ Plan of Care or Other Needs: none identified at this time. 2. Discharge plan for patient and Needs/Barriers: not identified at this time    3. Estimated Discharge Date: unknown. Posted on Whiteboard in [address] [place] [address] Room: yes      4. The patient's care plan was reviewed with the oncoming nurse. \"HEALS\" SAFETY CHECK      Fall Risk    Total Score: 0    Safety Measures:      A safety check occurred in the patient's room between off going nurse and oncoming nurse listed above.     The safety check included the below items  Area Items   H  High Alert Medications - Verify all high alert medication drips (heparin, PCA, etc.)   E  Equipment - Suction is set up for ALL patients (with rosalinda)  - Red plugs utilized for all equipment (IV pumps, etc.)  - WOWs wiped down at end of shift.  - Room stocked with oxygen, suction, and other unit-specific supplies   A  Alarms - Bed alarm is set for fall risk patients  - Ensure chair alarm is in place and activated if patient is up in a chair   L  Lines - Check IV for any infiltration  - Munoz bag is empty if patient has a Munoz   - Tubing and IV bags are labeled   S  Safety   - Room is clean, patient is clean, and equipment is clean. - Hallways are clear from equipment besides carts. - Fall bracelet on for fall risk patients  - Ensure room is clear and free of clutter  - Suction is set up for ALL patients (with rosalinda)  - Hallways are clear from equipment besides carts.    - Isolation precautions followed, supplies available outside room, sign posted     Wale Sanders RN

## 2021-01-21 NOTE — ROUTINE PROCESS
TRANSFER - IN REPORT: 0600 Verbal report received from Edilson Weston RN (name) on Marlen Richter  being received from [facility] ED(unit) for routine progression of care Report consisted of patients Situation, Background, Assessment and  
Recommendations(SBAR). Information from the following report(s) SBAR, Kardex, Intake/Output, MAR, Recent Results and Cardiac Rhythm ST was reviewed with the receiving nurse. Opportunity for questions and clarification was provided.    
 
 
 
 
Kristyn Calderon

## 2021-01-21 NOTE — H&P
[facility]  Pulmonary, Critical Care, and Sleep Medicine    Name: Armando Curiel MRN: [id number]   : 1975 Hospital: [facility]   Date: 2021        Critical Care Initial Patient Consult    IMPRESSION:   · Diabetic Ketoacidosis- likely due to poorly managed undiagnosed Dx of DM vs Latent Autoimmune Diabetes New to Metformin OP, no Insulin use. Pancreatic Islet Cell Antibodies pending. HgbA1c: >14. · Lactic Acidosis- likely above. Lactic Acid: 1.12- improved. · SIRS- likely New Onset DM/ DKA vs Abdominal Process vs Resp. Process vs Pre-Renal RYAN. WBC: 20.5- upward trending. Bandemia: 7. BCx pending. CXR (-). RUQ + Epigastric pain- Abdominal KUB (-) for SBO. Lipase: 183. Abdominal US, Amylase, Resp Viral Panel pending. · Epigastric + RUQ Pain- likely Cholecystitis vs Cholangitis vs Pancreatitis vs SBO. Lipase: 183. Amylase, Abdominal US pending. KUB (-) for SBO. Cardiac Panel (-). · RYAN- BUN: 16, Cr: 4.5- Improving, UA (+) 100 Proteinuria, +1 Amorphous Crystals, 0 to 3 Hyaline Casts, 11-20 Granular Casts, Few Mucus. Potential ATN vs UTI   · Electrolyte Derangement- Hyponatremia- Na: 134. Hypocalcemia, Calcium: 7.6. Hyperphosphatemia, Phos: 5.9. Patient Active Problem List   Diagnosis Code    DKA (diabetic ketoacidoses) ([facility] [address] E11.10        RECOMMENDATIONS:   Neuro: Monitor Neuro checks. Resp: Resp Viral Panel + COVID-19 Test pending. CXR (-). Aspiration Precautions, HOB >30'. I/D: Vanc/ Zosyn started for SIRS. Procal pending. WBC: 20.5. Trending WBCs + Temp. Heme/ Onc: WBC: 20.5. Procal pending. Monitor daily CBC w/ Diff, WBC, Procal, and Temp trends. CVS: Cardiac Panel (-) Monitor HD, MAP goal >65 mmHg. Metabolic: Lactic Acid: 1.2- improving, Bicarb scheduled. Mag replaced. Cont monitoring Daily BMP, Mag, Phos, and Electrolytes. Renal: RYAN, BUN: 16, Cr: 1.45- Improving, Receiving fluids. Trending renal parameters + Strict I&Os.   Endocrine: D50 + Fluids for Hyperglycemia. Pancreatic Islet Cell Antibodies pending. Monitor Q6 glucoses. GI: Clear Liquid Diet. Lipase: 183. KUB shows no SBO. Abdominal US + Amylase pending. Musc/Skin: No Acute Issues. Code Status: Full Code. Subjective/History: This patient has been seen and evaluated at the request of Dr. Shayan Holguin for mgmt of Diabetic Ketoacidosis on 01/21/2021. Patient is a 39 y.o. AA Male w/ no PMH, other than recent Dx of DM II, who presented to [facility] ER for epigastric pain and nausea w/ vomiting on on 01/20/2021. Pt stated that prior to his admission he was experiencing increased polyuria (voiding x6-8 daily, x6 nightly) and was recently Dx w/ DM II in 12/2020, and subsequently Rx Metformin PO. During his course in the ED, Critical Labs revealed severe DKA, Lactic Acidosis, Hyperglycemia, Electrolyte Derangements, and now meeting SIRS criteria. Pt was transferred to [facility] ICU on 01/21/2021 and is continued being monitored for DKA and receiving D5 and Fluids. No past medical history on file. No past surgical history on file. Prior to Admission medications    Medication Sig Start Date End Date Taking? Authorizing Provider   metFORMIN (GLUCOPHAGE) 500 mg tablet Take 500 mg by mouth two (2) times daily (with meals). Yes Other, MD Evelyne     Current Facility-Administered Medications   Medication Dose Route Frequency    insulin regular (NOVOLIN R, HUMULIN R) 100 Units in 0.9% sodium chloride 100 mL infusion  0-50 Units/hr IntraVENous TITRATE    dextrose 5 % - 0.45% NaCl infusion  150 mL/hr IntraVENous CONTINUOUS     Allergies   Allergen Reactions    Iodine Itching    Shellfish Derived Nausea and Vomiting      Social History     Tobacco Use    Smoking status: Not on file   Substance Use Topics    Alcohol use: Not on file      No family history on file.        Review of Systems:  Constitutional: negative for fevers, chills, sweats, fatigue and weight loss Objective:   Vital Signs:    Visit Vitals  BP (!) 166/92 (BP 1 Location: Left arm, BP Patient Position: At rest)   Pulse (!) 115   Temp 98.8 °F (37.1 °C)   Resp 28   Ht 5' 7\" (1.702 m)   Wt 102.7 kg (226 lb 6.6 oz)   SpO2 100%   BMI 35.46 kg/m²       O2 Device: Room air       Temp (24hrs), Av °F (36.7 °C), Min:97.6 °F (36.4 °C), Max:98.8 °F (37.1 °C)         Intake/Output:   Last shift:       07 -  190  In: 238.7 [I.V.:238.7]  Out: 0   Last 3 shifts: 1901 -  0700  In: 4400 [I.V.:4400]  Out: 1850 [Urine:1850]    Intake/Output Summary (Last 24 hours) at 2021 0748  Last data filed at 2021 5800  Gross per 24 hour   Intake 4638.66 ml   Output 1850 ml   Net 2788.66 ml       Physical Exam:  General:  A&Ox3, In no apparent distress, Laying comfortably in hospital bed   HENNT:  Normocephalic/ Atraumatic, No periorbital edema, Conjunctivae Pink & Moist B, No scleral icterus B, PERRLA, Nares w/out drainage, Nasal septum midline, No Frontal or Maxillary Sinus TTP, No oral ulcers or lesions, Uvula midline, Dentition adequate, No receeding gum lines, Neck supple, Thyroid w/out visible masses, No cervical lymphadenopathy    Back:   No visible trauma, Symmetric, No curvature, Normal ROM, No CVA TTP   Lungs:   Equal & symmetrical chest expansion, RR, CTAB   Heart:  No visible trauma to chest wall, Non-displaced PMI, Tachycardic, Regular Rhythm, No R/M/G   Abdomen:   Obese, Soft, Mildly distended, Normoactive bowel sounds x4 quads, Mild TTP to Epigastrium + RUQ, No TTP to RLQ/ LLQ, No rigidity, No masses/ organomegaly appreaciated   Extremities:  UE/LW atraumatic, No Cyanosis, pitting edema, ulcers or brawny skin changes   Pulses: 2+ and Symmetric peripheral pulses.  No carotid aa bruits appreaciated   Skin: Serrato Type VI, Smooth skin texture, Normal skin turgor, No rashes or lesions          Lymph Nodes:  Cervical, supraclavicular, axillary nodes, and Inguinal nodes w/out lympadenopathy   Neurologic: CN II-XII intact, 5/5 strength in all planes, Babinski's reflex intact, Sharp & Dull senses intact to LE B       Data:     Recent Results (from the past 24 hour(s))   EKG, 12 LEAD, INITIAL    Collection Time: 01/20/21 11:09 PM   Result Value Ref Range    Ventricular Rate 125 BPM    Atrial Rate 125 BPM    P-R Interval 128 ms    QRS Duration 86 ms    Q-T Interval 344 ms    QTC Calculation (Bezet) 496 ms    Calculated P Axis 57 degrees    Calculated R Axis 1 degrees    Calculated T Axis 59 degrees    Diagnosis       Sinus tachycardia  Otherwise normal ECG  No previous ECGs available     POC LACTIC ACID    Collection Time: 01/20/21 11:26 PM   Result Value Ref Range    Lactic Acid (POC) 2.89 (HH) 0.40 - 2.00 mmol/L   CULTURE, BLOOD    Collection Time: 01/20/21 11:27 PM    Specimen: Blood   Result Value Ref Range    Special Requests: LEFT  Antecubital        Culture result: NO GROWTH AFTER 1 HOUR     CBC WITH AUTOMATED DIFF    Collection Time: 01/20/21 11:27 PM   Result Value Ref Range    WBC 20.5 (H) 4.6 - 13.2 K/uL    RBC 6.52 (H) 4.70 - 5.50 M/uL    HGB 17.4 (H) 13.0 - 16.0 g/dL    HCT 48.1 (H) 36.0 - 48.0 %    MCV 73.8 (L) 74.0 - 97.0 FL    MCH 26.7 24.0 - 34.0 PG    MCHC 36.2 31.0 - 37.0 g/dL    RDW 15.1 (H) 11.6 - 14.5 %    PLATELET 330 736 - 579 K/uL    NEUTROPHILS 85 (H) 42 - 75 %    BAND NEUTROPHILS 7 (H) 0 - 5 %    LYMPHOCYTES 5 (L) 20 - 51 %    MONOCYTES 2 2 - 9 %    EOSINOPHILS 0 0 - 5 %    BASOPHILS 0 0 - 3 %    METAMYELOCYTES 1 (H) 0 %    ABS. NEUTROPHILS 17.4 (H) 1.8 - 8.0 K/UL    ABS. LYMPHOCYTES 1.0 0.8 - 3.5 K/UL    ABS. MONOCYTES 0.4 0 - 1.0 K/UL    ABS. EOSINOPHILS 0.0 0.0 - 0.4 K/UL    ABS.  BASOPHILS 0.0 0.0 - 0.1 K/UL    PLATELET COMMENTS LARGE PLATELETS      RBC COMMENTS ANISOCYTOSIS  1+        DF MANUAL     METABOLIC PANEL, COMPREHENSIVE    Collection Time: 01/20/21 11:27 PM   Result Value Ref Range    Sodium 125 (L) 136 - 145 mmol/L    Potassium 4.7 3.5 - 5.5 mmol/L Chloride 91 (L) 100 - 111 mmol/L    CO2 7 (LL) 21 - 32 mmol/L    Anion gap 27 (H) 3.0 - 18 mmol/L    Glucose 404 (HH) 74 - 99 mg/dL    BUN 18 7.0 - 18 MG/DL    Creatinine 1.86 (H) 0.6 - 1.3 MG/DL    BUN/Creatinine ratio 10 (L) 12 - 20      GFR est AA 48 (L) >60 ml/min/1.73m2    GFR est non-AA 40 (L) >60 ml/min/1.73m2    Calcium 9.1 8.5 - 10.1 MG/DL    Bilirubin, total 1.0 0.2 - 1.0 MG/DL    ALT (SGPT) 22 16 - 61 U/L    AST (SGOT) 17 10 - 38 U/L    Alk. phosphatase 95 45 - 117 U/L    Protein, total 10.1 (H) 6.4 - 8.2 g/dL    Albumin 4.4 3.4 - 5.0 g/dL    Globulin 5.7 (H) 2.0 - 4.0 g/dL    A-G Ratio 0.8 0.8 - 1.7     POC VENOUS BLOOD GAS    Collection Time: 01/20/21 11:27 PM   Result Value Ref Range    Device: ROOM AIR      pH, venous (POC) 7.03 (LL) 7.32 - 7.42      pCO2, venous (POC) 18.3 (L) 41 - 51 MMHG    pO2, venous (POC) 44 (H) 25 - 40 mmHg    HCO3, venous (POC) 4.8 (L) 23.0 - 28.0 MMOL/L    sO2, venous (POC) 60 (L) 65 - 88 %    Base deficit, venous (POC) 24 mmol/L    Allens test (POC) N/A      Total resp.  rate 31      Site LEFT BRACHIAL      Patient temp. 98.3      Specimen type (POC) VENOUS BLOOD      Performed by Lorena Mathur    MAGNESIUM    Collection Time: 01/20/21 11:27 PM   Result Value Ref Range    Magnesium 1.8 1.6 - 2.6 mg/dL   PHOSPHORUS    Collection Time: 01/20/21 11:27 PM   Result Value Ref Range    Phosphorus 5.9 (H) 2.5 - 4.9 MG/DL   HEMOGLOBIN A1C WITH EAG    Collection Time: 01/20/21 11:27 PM   Result Value Ref Range    Hemoglobin A1c >14.0 (H) 4.2 - 5.6 %    Est. average glucose Cannot be calculated mg/dL   GLUCOSE, POC    Collection Time: 01/20/21 11:33 PM   Result Value Ref Range    Glucose (POC) 423 (HH) 70 - 110 mg/dL   GLUCOSE, POC    Collection Time: 01/20/21 11:35 PM   Result Value Ref Range    Glucose (POC) 421 (HH) 70 - 110 mg/dL   CULTURE, BLOOD    Collection Time: 01/20/21 11:40 PM    Specimen: Blood   Result Value Ref Range    Special Requests: NO SPECIAL REQUESTS  LEFT  Antecubital        Culture result: NO GROWTH AFTER 1 HOUR     URINALYSIS W/ RFLX MICROSCOPIC    Collection Time: 01/21/21 12:20 AM   Result Value Ref Range    Color YELLOW      Appearance CLEAR      Specific gravity 1.028 1.005 - 1.030      pH (UA) 5.0 5.0 - 8.0      Protein 100 (A) NEG mg/dL    Glucose >1,000 (A) NEG mg/dL    Ketone >160 (A) NEG mg/dL    Bilirubin Negative NEG      Blood MODERATE (A) NEG      Urobilinogen 0.2 0.2 - 1.0 EU/dL    Nitrites Negative NEG      Leukocyte Esterase Negative NEG     URINE MICROSCOPIC ONLY    Collection Time: 01/21/21 12:20 AM   Result Value Ref Range    WBC 0 to 3 0 - 4 /hpf    RBC 4 to 10 0 - 5 /hpf    Epithelial cells FEW 0 - 5 /lpf    Bacteria Negative NEG /hpf    Mucus FEW (A) NEG /lpf    Amorphous Crystals 1+ (A) NEG    Hyaline cast 0 to 3 0 - 2 /lpf    Granular cast 11 to 20 NEG /lpf   GLUCOSE, POC    Collection Time: 01/21/21 12:55 AM   Result Value Ref Range    Glucose (POC) 387 (H) 70 - 110 mg/dL   GLUCOSTABILIZER    Collection Time: 01/21/21 12:59 AM   Result Value Ref Range    Glucose 387 mg/dL    Insulin order 6.5 units/hour    Insulin adminstered 6.5 units/hour    Multiplier 0.020     Low target 140 mg/dL    High target 180 mg/dL    D50 order 0.0 ml    D50 administered 0.00 ml    Minutes until next BG 60 min    Order initials rjs     Administered initials rjs    POC LACTIC ACID    Collection Time: 01/21/21  1:28 AM   Result Value Ref Range    Lactic Acid (POC) 2.01 (HH) 0.40 - 2.00 mmol/L   GLUCOSE, POC    Collection Time: 01/21/21  2:05 AM   Result Value Ref Range    Glucose (POC) 336 (H) 70 - 110 mg/dL   GLUCOSTABILIZER    Collection Time: 01/21/21  2:11 AM   Result Value Ref Range    Glucose 336 mg/dL    Insulin order 8.3 units/hour    Insulin adminstered 8.3 units/hour    Multiplier 0.030     Low target 140 mg/dL    High target 180 mg/dL    D50 order 0.0 ml    D50 administered 0.00 ml    Minutes until next BG 60 min    Order initials rjs Administered initials rjs    GLUCOSE, POC    Collection Time: 01/21/21  3:09 AM   Result Value Ref Range    Glucose (POC) 282 (H) 70 - 110 mg/dL   GLUCOSTABILIZER    Collection Time: 01/21/21  3:11 AM   Result Value Ref Range    Glucose 282 mg/dL    Insulin order 8.9 units/hour    Insulin adminstered 8.9 units/hour    Multiplier 0.040     Low target 140 mg/dL    High target 180 mg/dL    D50 order 0.0 ml    D50 administered 0.00 ml    Minutes until next BG 60 min    Order initials jpb     Administered initials jpb    POC LACTIC ACID    Collection Time: 01/21/21  3:25 AM   Result Value Ref Range    Lactic Acid (POC) 1.18 0.40 - 2.00 mmol/L   POC VENOUS BLOOD GAS    Collection Time: 01/21/21  3:31 AM   Result Value Ref Range    Device: ROOM AIR      FIO2 (POC) 99 %    pH, venous (POC) 6.98 (LL) 7.32 - 7.42      pCO2, venous (POC) 14.1 (L) 41 - 51 MMHG    pO2, venous (POC) 65 (H) 25 - 40 mmHg    HCO3, venous (POC) 3.4 (L) 23.0 - 28.0 MMOL/L    sO2, venous (POC) 80 65 - 88 %    Base deficit, venous (POC) 27 mmol/L    Allens test (POC) N/A      Total resp.  rate 39      Site [facility]      Patient temp. 97.8      Specimen type (POC) VENOUS BLOOD      Performed by [address] [place] [address], [facility]    Collection Time: 01/21/21  3:33 AM   Result Value Ref Range    Sodium 134 (L) 136 - 145 mmol/L    Potassium 4.7 3.5 - 5.5 mmol/L    Chloride 104 100 - 111 mmol/L    CO2 6 (LL) 21 - 32 mmol/L    Anion gap 24 (H) 3.0 - 18 mmol/L    Glucose 245 (H) 74 - 99 mg/dL    BUN 16 7.0 - 18 MG/DL    Creatinine 1.45 (H) 0.6 - 1.3 MG/DL    BUN/Creatinine ratio 11 (L) 12 - 20      GFR est AA >60 >60 ml/min/1.73m2    GFR est non-AA 53 (L) >60 ml/min/1.73m2    Calcium 7.6 (L) 8.5 - 10.1 MG/DL   MAGNESIUM    Collection Time: 01/21/21  3:33 AM   Result Value Ref Range    Magnesium 1.7 1.6 - 2.6 mg/dL   GLUCOSE, POC    Collection Time: 01/21/21  4:14 AM   Result Value Ref Range    Glucose (POC) 210 (H) 70 - 110 mg/dL   GLUCOSTABILIZER Collection Time: 01/21/21  4:16 AM   Result Value Ref Range    Glucose 210 mg/dL    Insulin order 7.5 units/hour    Insulin adminstered 7.5 units/hour    Multiplier 0.050     Low target 140 mg/dL    High target 180 mg/dL    D50 order 0.0 ml    D50 administered 0.00 ml    Minutes until next BG 60 min    Order initials rjs     Administered initials rjs    GLUCOSE, POC    Collection Time: 01/21/21  5:16 AM   Result Value Ref Range    Glucose (POC) 187 (H) 70 - 110 mg/dL   GLUCOSTABILIZER    Collection Time: 01/21/21  5:21 AM   Result Value Ref Range    Glucose 187 mg/dL    Insulin order 7.6 units/hour    Insulin adminstered 7.6 units/hour    Multiplier 0.060     Low target 140 mg/dL    High target 180 mg/dL    D50 order 0.0 ml    D50 administered 0.00 ml    Minutes until next BG 60 min    Order initials jpb     Administered initials jpb    GLUCOSE, POC    Collection Time: 01/21/21  6:19 AM   Result Value Ref Range    Glucose (POC) 178 (H) 70 - 110 mg/dL   GLUCOSTABILIZER    Collection Time: 01/21/21  6:20 AM   Result Value Ref Range    Glucose 178 mg/dL    Insulin order 7.1 units/hour    Insulin adminstered 7.1 units/hour    Multiplier 0.060     Low target 140 mg/dL    High target 180 mg/dL    D50 order 0.0 ml    D50 administered 0.00 ml    Minutes until next BG 60 min    Order initials jpb     Administered initials jpb              Recent Labs     01/21/21  0331   FIO2I 99       Telemetry: Sinus Tachycardia       Imaging:  I have personally reviewed the patients radiographs and have reviewed the reports:  CXR Results  (Last 48 hours)               01/20/21 2345  XR CHEST PORT Final result    Impression:      Negative chest.           Narrative:  EXAM: CHEST ONE VIEW  portable 2337 hours       CLINICAL HISTORY/INDICATION: meets SIRS criteria , nausea, abdominal pain,   vomiting, tachycardia, tachypnea, diagnosed with diabetes one day ago       COMPARISON: None. TECHNIQUE: One view obtained.         FINDINGS: The cardiac and mediastinal silhouette is normal. The lungs are clear. Pulmonary   vascularity is normal. The costophrenic angles are sharply defined. No bony   abnormalities are seen. Best Practice:  Glycemic Control  IHI ICU Bundles:    MVentilated patients- VAP bundle , aim to keep peak plateau pressure [id number] H2O  Sress Ulcer Prophylaxis  DVT Prophylaxis  Need for Lines, Munoz Assessed  Palliative Care Consult        Total of     min critical care time spent at bedside during the course of care providing evaluation,management and care decisions and ordering appropriate treatment related to critical care problems exclusive of procedures. The reason for providing this level of medical care for this critically ill patient was due a critical illness that impaired one or more vital organ systems such that there was a high probability of imminent or life threatening deterioration in the patients condition. This care involved high complexity decision making to assess, manipulate, and support vital system functions, to treat this degree vital organ system failure and to prevent further life threatening deterioration of the patients condition.       LULI Zavala

## 2021-01-21 NOTE — PROGRESS NOTES
[facility]43 [facility].      1920 Bedside shift change report given to ANNEMARIE España (oncoming nurse) by Babar Newby RN (offgoing nurse). Report included the following information SBAR, Kardex, Intake/Output, MAR, Recent Results and Med Rec Status.

## 2021-01-21 NOTE — DIABETES MGMT
GLYCEMIC CONTROL PLAN OF CARE     Assessment/Recommendations:  Pt admitted with DKA. New diagnosis of diabetes. PCP started pt on metformin. A1C >14%. Anion gap currently 24. CO2 6. Continue GlucoStabilizer insulin gtt for today. Will continue inpatient monitoring. Most recent blood glucose values:      Results for Deena Escobedo (MRN [id number]) as of 1/21/2021 14:15   Ref. Range 1/21/2021 10:02 1/21/2021 10:24 1/21/2021 11:12 1/21/2021 12:12 1/21/2021 13:42   GLUCOSE,FAST - POC Latest Ref Range: 70 - 110 mg/dL 170 (H) 198 (H) 227 (H) 204 (H) 170 (H)       Current A1C of >14 % is equivalent to average blood glucose of 355 mg/dl over the past 2-3 months. Current hospital diabetes medications:   GlucoStabilizer insulin gtt        Home diabetes medications:  Newly diagnosed, started on Metformin, only took two doses. Will need insulin at discharge  Diet:    NPO  Education:  ____Refer to Diabetes Education Record             ____Education not indicated at this time  Covid-19 pending. Droplet isolation. Will provide education when pt feeling better.     Nataly Hunt

## 2021-01-21 NOTE — PROGRESS NOTES
[facility]  Progress Note    Patient: aPolo Brewer Age: 39 y.o. : 1975 MR#: [id number] SSN: [id number]  Date: 2021   Admitted as transfer from [facility] ER to ICU on 21 DKA, lactic acidosis, SIRS, epigastric/RUQ pain, RYAN and electrolyte imbalance. Regular insulin infusion, D5.45% IVF for DKA/hyperglycemia. IV antibiotics initiated vanco 1G x1 dose and zosyn 4.5G x1 dose. Electrolyte replacements given, sodium bicarb/mag/sodium phophate. IVF bolus x4 liter doses. Subjective:   Summary of hospital course reported via EMR. 39 y.o. AA Male w/ no PMH, other than recent Dx of DM II, who presented to [facility] ER for epigastric pain and nausea w/ vomiting on on 2021. Pt stated that prior to his admission he was experiencing increased polyuria (voiding x6-8 daily, x6 nightly) since December. He also c/o 25 lb weight loss over the last two months. He went to his family doctor who tested his urine and was diagnosed with \"prediabetes\"  And subsequently Rx Metformin PO. Following taking of metformin he developed acute abdominal pain. States pain is epigastric and RUQ. Seen in ICU  Epigastric pain 5/10. Constant pain. At times increased pain with PO intake when he first was able to have PO. Denies N/V. Alert and oriented x3. Assessment/Plan:   1. DKA likely due to poorly managed DM v latent autoimmune DM  2. Lactic acidosis due to DKA  3. SIRS likely due to DKA  4. Leukocytosis   5. Epigastric/RUQ pain likely cholecystitis v cholangitis v pancreatitis v SBO  6. RYAN  7. Electrolyte imbalance. Hyponatremia/hypocalcemia/hypophasphatemia     Plan  1. Transfer to stepdown  2. Continue insulin gtt to manage DKA. Follow DKA orders and monitor POC. Continue D5.45% IVF. Diabetes management consulted for assistance with education and medication recommendations. 3. CBC results pending. Follow cultures. Continue vanco and zosyn.  Follow lactic acid per protocol  4. US abd today. Impression no evidence of cholelithiasis or cholecystitis. Pancrease could not be seen on US. Lipase 183. 5. KUB impression nonspecific bowel gas pattern. No definite evidence of obstruction. 6. Monitor metabolic panel, magnesium, phosphorus and replete. Electrolyte replacements given today. 7. Monitor renal function. 8. Lovenox, Pepcid  9. Pain management and antiemetics as needed  10. Full code     Additional Notes:      Case discussed with:  [x]Patient  []Family  [x]Nursing  []Case Management  DVT Prophylaxis:  [x]Lovenox  []Hep SQ  []SCDs  []Coumadin   []On Heparin gtt    Objective:   VS:   Visit Vitals  BP (!) 146/92   Pulse (!) 112   Temp 99.1 °F (37.3 °C)   Resp 24   Ht 5' 7\" (1.702 m)   Wt 102.7 kg (226 lb 6.6 oz)   SpO2 100%   BMI 35.46 kg/m²      Tmax/24hrs: Temp (24hrs), Av.2 °F (36.8 °C), Min:97.6 °F (36.4 °C), Max:99.1 °F (37.3 °C)      Intake/Output Summary (Last 24 hours) at 2021 1804  Last data filed at 2021 1600  Gross per 24 hour   Intake 4638.66 ml   Output 3775 ml   Net 863.66 ml       General:  Alert, NAD  Cardiovascular:  Tachycardia   Pulmonary:  LSC throughout; respiratory effort WNL  GI:  Epigastric tenderness upon palpation, abdominal obesity  Extremities:  No edema; 2+ dorsalis pedis pulses bilaterally  Neuro: alert and oriented.          Labs:    Recent Results (from the past 24 hour(s))   EKG, 12 LEAD, INITIAL    Collection Time: 21 11:09 PM   Result Value Ref Range    Ventricular Rate 125 BPM    Atrial Rate 125 BPM    P-R Interval 128 ms    QRS Duration 86 ms    Q-T Interval 344 ms    QTC Calculation (Bezet) 496 ms    Calculated P Axis 57 degrees    Calculated R Axis 1 degrees    Calculated T Axis 59 degrees    Diagnosis       Sinus tachycardia  Otherwise normal ECG  No previous ECGs available  Confirmed by Bridget Palomares MD, Loi Stout ([id number]) on 2021 1:01:21 PM     POC LACTIC ACID    Collection Time: 21 11:26 PM   Result Value Ref Range    Lactic Acid (POC) 2.89 (HH) 0.40 - 2.00 mmol/L   CULTURE, BLOOD    Collection Time: 01/20/21 11:27 PM    Specimen: Blood   Result Value Ref Range    Special Requests: LEFT  Antecubital        Culture result: NO GROWTH AFTER 1 HOUR     CBC WITH AUTOMATED DIFF    Collection Time: 01/20/21 11:27 PM   Result Value Ref Range    WBC 20.5 (H) 4.6 - 13.2 K/uL    RBC 6.52 (H) 4.70 - 5.50 M/uL    HGB 17.4 (H) 13.0 - 16.0 g/dL    HCT 48.1 (H) 36.0 - 48.0 %    MCV 73.8 (L) 74.0 - 97.0 FL    MCH 26.7 24.0 - 34.0 PG    MCHC 36.2 31.0 - 37.0 g/dL    RDW 15.1 (H) 11.6 - 14.5 %    PLATELET 109 113 - 116 K/uL    NEUTROPHILS 85 (H) 42 - 75 %    BAND NEUTROPHILS 7 (H) 0 - 5 %    LYMPHOCYTES 5 (L) 20 - 51 %    MONOCYTES 2 2 - 9 %    EOSINOPHILS 0 0 - 5 %    BASOPHILS 0 0 - 3 %    METAMYELOCYTES 1 (H) 0 %    ABS. NEUTROPHILS 17.4 (H) 1.8 - 8.0 K/UL    ABS. LYMPHOCYTES 1.0 0.8 - 3.5 K/UL    ABS. MONOCYTES 0.4 0 - 1.0 K/UL    ABS. EOSINOPHILS 0.0 0.0 - 0.4 K/UL    ABS. BASOPHILS 0.0 0.0 - 0.1 K/UL    PLATELET COMMENTS LARGE PLATELETS      RBC COMMENTS ANISOCYTOSIS  1+        DF MANUAL     METABOLIC PANEL, COMPREHENSIVE    Collection Time: 01/20/21 11:27 PM   Result Value Ref Range    Sodium 125 (L) 136 - 145 mmol/L    Potassium 4.7 3.5 - 5.5 mmol/L    Chloride 91 (L) 100 - 111 mmol/L    CO2 7 (LL) 21 - 32 mmol/L    Anion gap 27 (H) 3.0 - 18 mmol/L    Glucose 404 (HH) 74 - 99 mg/dL    BUN 18 7.0 - 18 MG/DL    Creatinine 1.86 (H) 0.6 - 1.3 MG/DL    BUN/Creatinine ratio 10 (L) 12 - 20      GFR est AA 48 (L) >60 ml/min/1.73m2    GFR est non-AA 40 (L) >60 ml/min/1.73m2    Calcium 9.1 8.5 - 10.1 MG/DL    Bilirubin, total 1.0 0.2 - 1.0 MG/DL    ALT (SGPT) 22 16 - 61 U/L    AST (SGOT) 17 10 - 38 U/L    Alk.  phosphatase 95 45 - 117 U/L    Protein, total 10.1 (H) 6.4 - 8.2 g/dL    Albumin 4.4 3.4 - 5.0 g/dL    Globulin 5.7 (H) 2.0 - 4.0 g/dL    A-G Ratio 0.8 0.8 - 1.7     POC VENOUS BLOOD GAS    Collection Time: 01/20/21 11:27 PM   Result Value Ref Range Device: ROOM AIR      pH, venous (POC) 7.03 (LL) 7.32 - 7.42      pCO2, venous (POC) 18.3 (L) 41 - 51 MMHG    pO2, venous (POC) 44 (H) 25 - 40 mmHg    HCO3, venous (POC) 4.8 (L) 23.0 - 28.0 MMOL/L    sO2, venous (POC) 60 (L) 65 - 88 %    Base deficit, venous (POC) 24 mmol/L    Allens test (POC) N/A      Total resp.  rate 31      Site LEFT BRACHIAL      Patient temp. 98.3      Specimen type (POC) VENOUS BLOOD      Performed by Jordyn Huber    MAGNESIUM    Collection Time: 01/20/21 11:27 PM   Result Value Ref Range    Magnesium 1.8 1.6 - 2.6 mg/dL   PHOSPHORUS    Collection Time: 01/20/21 11:27 PM   Result Value Ref Range    Phosphorus 5.9 (H) 2.5 - 4.9 MG/DL   HEMOGLOBIN A1C WITH EAG    Collection Time: 01/20/21 11:27 PM   Result Value Ref Range    Hemoglobin A1c >14.0 (H) 4.2 - 5.6 %    Est. average glucose Cannot be calculated mg/dL   GLUCOSE, POC    Collection Time: 01/20/21 11:33 PM   Result Value Ref Range    Glucose (POC) 423 (HH) 70 - 110 mg/dL   GLUCOSE, POC    Collection Time: 01/20/21 11:35 PM   Result Value Ref Range    Glucose (POC) 421 (HH) 70 - 110 mg/dL   CULTURE, BLOOD    Collection Time: 01/20/21 11:40 PM    Specimen: Blood   Result Value Ref Range    Special Requests: NO SPECIAL REQUESTS  LEFT  Antecubital        Culture result: NO GROWTH AFTER 1 HOUR     URINALYSIS W/ RFLX MICROSCOPIC    Collection Time: 01/21/21 12:20 AM   Result Value Ref Range    Color YELLOW      Appearance CLEAR      Specific gravity 1.028 1.005 - 1.030      pH (UA) 5.0 5.0 - 8.0      Protein 100 (A) NEG mg/dL    Glucose >1,000 (A) NEG mg/dL    Ketone >160 (A) NEG mg/dL    Bilirubin Negative NEG      Blood MODERATE (A) NEG      Urobilinogen 0.2 0.2 - 1.0 EU/dL    Nitrites Negative NEG      Leukocyte Esterase Negative NEG     URINE MICROSCOPIC ONLY    Collection Time: 01/21/21 12:20 AM   Result Value Ref Range    WBC 0 to 3 0 - 4 /hpf    RBC 4 to 10 0 - 5 /hpf    Epithelial cells FEW 0 - 5 /lpf    Bacteria Negative NEG /hpf    Mucus FEW (A) NEG /lpf    Amorphous Crystals 1+ (A) NEG    Hyaline cast 0 to 3 0 - 2 /lpf    Granular cast 11 to 20 NEG /lpf   GLUCOSE, POC    Collection Time: 01/21/21 12:55 AM   Result Value Ref Range    Glucose (POC) 387 (H) 70 - 110 mg/dL   GLUCOSTABILIZER    Collection Time: 01/21/21 12:59 AM   Result Value Ref Range    Glucose 387 mg/dL    Insulin order 6.5 units/hour    Insulin adminstered 6.5 units/hour    Multiplier 0.020     Low target 140 mg/dL    High target 180 mg/dL    D50 order 0.0 ml    D50 administered 0.00 ml    Minutes until next BG 60 min    Order initials rjs     Administered initials rjs    POC LACTIC ACID    Collection Time: 01/21/21  1:28 AM   Result Value Ref Range    Lactic Acid (POC) 2.01 (HH) 0.40 - 2.00 mmol/L   GLUCOSE, POC    Collection Time: 01/21/21  2:05 AM   Result Value Ref Range    Glucose (POC) 336 (H) 70 - 110 mg/dL   GLUCOSTABILIZER    Collection Time: 01/21/21  2:11 AM   Result Value Ref Range    Glucose 336 mg/dL    Insulin order 8.3 units/hour    Insulin adminstered 8.3 units/hour    Multiplier 0.030     Low target 140 mg/dL    High target 180 mg/dL    D50 order 0.0 ml    D50 administered 0.00 ml    Minutes until next BG 60 min    Order initials rjs     Administered initials rjs    GLUCOSE, POC    Collection Time: 01/21/21  3:09 AM   Result Value Ref Range    Glucose (POC) 282 (H) 70 - 110 mg/dL   GLUCOSTABILIZER    Collection Time: 01/21/21  3:11 AM   Result Value Ref Range    Glucose 282 mg/dL    Insulin order 8.9 units/hour    Insulin adminstered 8.9 units/hour    Multiplier 0.040     Low target 140 mg/dL    High target 180 mg/dL    D50 order 0.0 ml    D50 administered 0.00 ml    Minutes until next BG 60 min    Order initials jpb     Administered initials jpb    POC LACTIC ACID    Collection Time: 01/21/21  3:25 AM   Result Value Ref Range    Lactic Acid (POC) 1.18 0.40 - 2.00 mmol/L   POC VENOUS BLOOD GAS    Collection Time: 01/21/21  3:31 AM   Result Value Ref Range    Device: ROOM AIR      FIO2 (POC) 99 %    pH, venous (POC) 6.98 (LL) 7.32 - 7.42      pCO2, venous (POC) 14.1 (L) 41 - 51 MMHG    pO2, venous (POC) 65 (H) 25 - 40 mmHg    HCO3, venous (POC) 3.4 (L) 23.0 - 28.0 MMOL/L    sO2, venous (POC) 80 65 - 88 %    Base deficit, venous (POC) 27 mmol/L    Allens test (POC) N/A      Total resp.  rate 39      Site [facility]      Patient temp. 97.8      Specimen type (POC) VENOUS BLOOD      Performed by [address] [place] [address], [facility]    Collection Time: 01/21/21  3:33 AM   Result Value Ref Range    Sodium 134 (L) 136 - 145 mmol/L    Potassium 4.7 3.5 - 5.5 mmol/L    Chloride 104 100 - 111 mmol/L    CO2 6 (LL) 21 - 32 mmol/L    Anion gap 24 (H) 3.0 - 18 mmol/L    Glucose 245 (H) 74 - 99 mg/dL    BUN 16 7.0 - 18 MG/DL    Creatinine 1.45 (H) 0.6 - 1.3 MG/DL    BUN/Creatinine ratio 11 (L) 12 - 20      GFR est AA >60 >60 ml/min/1.73m2    GFR est non-AA 53 (L) >60 ml/min/1.73m2    Calcium 7.6 (L) 8.5 - 10.1 MG/DL   MAGNESIUM    Collection Time: 01/21/21  3:33 AM   Result Value Ref Range    Magnesium 1.7 1.6 - 2.6 mg/dL   GLUCOSE, POC    Collection Time: 01/21/21  4:14 AM   Result Value Ref Range    Glucose (POC) 210 (H) 70 - 110 mg/dL   GLUCOSTABILIZER    Collection Time: 01/21/21  4:16 AM   Result Value Ref Range    Glucose 210 mg/dL    Insulin order 7.5 units/hour    Insulin adminstered 7.5 units/hour    Multiplier 0.050     Low target 140 mg/dL    High target 180 mg/dL    D50 order 0.0 ml    D50 administered 0.00 ml    Minutes until next BG 60 min    Order initials rjs     Administered initials rjs    GLUCOSE, POC    Collection Time: 01/21/21  5:16 AM   Result Value Ref Range    Glucose (POC) 187 (H) 70 - 110 mg/dL   GLUCOSTABILIZER    Collection Time: 01/21/21  5:21 AM   Result Value Ref Range    Glucose 187 mg/dL    Insulin order 7.6 units/hour    Insulin adminstered 7.6 units/hour    Multiplier 0.060     Low target 140 mg/dL    High target 180 mg/dL    D50 order 0.0 ml D50 administered 0.00 ml    Minutes until next BG 60 min    Order initials jpb     Administered initials jpb    GLUCOSE, POC    Collection Time: 01/21/21  6:19 AM   Result Value Ref Range    Glucose (POC) 178 (H) 70 - 110 mg/dL   GLUCOSTABILIZER    Collection Time: 01/21/21  6:20 AM   Result Value Ref Range    Glucose 178 mg/dL    Insulin order 7.1 units/hour    Insulin adminstered 7.1 units/hour    Multiplier 0.060     Low target 140 mg/dL    High target 180 mg/dL    D50 order 0.0 ml    D50 administered 0.00 ml    Minutes until next BG 60 min    Order initials jpb     Administered initials jpb    GLUCOSE, POC    Collection Time: 01/21/21  8:18 AM   Result Value Ref Range    Glucose (POC) 172 (H) 70 - 110 mg/dL   GLUCOSTABILIZER    Collection Time: 01/21/21  8:33 AM   Result Value Ref Range    Glucose 172 mg/dL    Insulin order 2.2 units/hour    Insulin adminstered 2.2 units/hour    Multiplier 0.020     Low target 140 mg/dL    High target 180 mg/dL    D50 order 0.0 ml    D50 administered 0.00 ml    Minutes until next BG 60 min    Order initials lmk     Administered initials lmk    LACTIC ACID    Collection Time: 01/21/21  9:40 AM   Result Value Ref Range    Lactic acid 1.2 0.4 - 2.0 MMOL/L   CARDIAC PANEL,(CK, CKMB & TROPONIN)    Collection Time: 01/21/21  9:40 AM   Result Value Ref Range    CK - MB 1.3 <3.6 ng/ml    CK-MB Index 0.4 0.0 - 4.0 %     39 - 308 U/L    Troponin-I, QT <0.02 0.0 - 0.045 NG/ML   LIPASE    Collection Time: 01/21/21  9:40 AM   Result Value Ref Range    Lipase 183 73 - 393 U/L   GLUCOSE, POC    Collection Time: 01/21/21 10:02 AM   Result Value Ref Range    Glucose (POC) 170 (H) 70 - 110 mg/dL   GLUCOSTABILIZER    Collection Time: 01/21/21 10:02 AM   Result Value Ref Range    Glucose 170 mg/dL    Insulin order 2.2 units/hour    Insulin adminstered 2.2 units/hour    Multiplier 0.020     Low target 140 mg/dL    High target 180 mg/dL    D50 order 0.0 ml    D50 administered 0.00 ml Minutes until next BG 60 min    Order initials lmk     Administered initials lmk    RESPIRATORY VIRUS PANEL W/COVID-19, PCR    Collection Time: 01/21/21 10:13 AM    Specimen: Nasopharyngeal   Result Value Ref Range    Adenovirus Not detected NOTD      Coronavirus 229E Not detected NOTD      Coronavirus HKU1 Not detected NOTD      Coronavirus CVNL63 Not detected NOTD      Coronavirus OC43 Not detected NOTD      Metapneumovirus Not detected NOTD      Rhinovirus and Enterovirus Not detected NOTD      Influenza A Not detected NOTD      Influenza B Not detected NOTD      Parainfluenza 1 Not detected NOTD      Parainfluenza 2 Not detected NOTD      Parainfluenza 3 Not detected NOTD      Parainfluenza virus 4 Not detected NOTD      RSV by PCR Not detected NOTD      B. parapertussis, PCR Not detected NOTD      Bordetella pertussis - PCR Not detected NOTD      Chlamydophila pneumoniae DNA, QL, PCR Not detected NOTD      Mycoplasma pneumoniae DNA, QL, PCR Not detected NOTD      SARS-CoV-2, PCR Not detected NOTD     GLUCOSE, POC    Collection Time: 01/21/21 10:24 AM   Result Value Ref Range    Glucose (POC) 198 (H) 70 - 110 mg/dL   GLUCOSE, POC    Collection Time: 01/21/21 11:12 AM   Result Value Ref Range    Glucose (POC) 227 (H) 70 - 110 mg/dL   GLUCOSTABILIZER    Collection Time: 01/21/21 11:13 AM   Result Value Ref Range    Glucose 227 mg/dL    Insulin order 5.0 units/hour    Insulin adminstered 5.0 units/hour    Multiplier 0.030     Low target 140 mg/dL    High target 180 mg/dL    D50 order 0.0 ml    D50 administered 0.00 ml    Minutes until next BG 60 min    Order initials lmk     Administered initials lmk    GLUCOSE, POC    Collection Time: 01/21/21 12:12 PM   Result Value Ref Range    Glucose (POC) 204 (H) 70 - 110 mg/dL   GLUCOSTABILIZER    Collection Time: 01/21/21 12:38 PM   Result Value Ref Range    Glucose 204 mg/dL    Insulin order 5.8 units/hour    Insulin adminstered 5.8 units/hour    Multiplier 0.040     Low target 140 mg/dL    High target 180 mg/dL    D50 order 0.0 ml    D50 administered 0.00 ml    Minutes until next BG 60 min    Order initials lmk     Administered initials lmk    GLUCOSE, POC    Collection Time: 01/21/21  1:42 PM   Result Value Ref Range    Glucose (POC) 170 (H) 70 - 110 mg/dL   GLUCOSTABILIZER    Collection Time: 01/21/21  1:43 PM   Result Value Ref Range    Glucose 170 mg/dL    Insulin order 4.4 units/hour    Insulin adminstered 4.4 units/hour    Multiplier 0.040     Low target 140 mg/dL    High target 180 mg/dL    D50 order 0.0 ml    D50 administered 0.00 ml    Minutes until next BG 60 min    Order initials lmk     Administered initials lmk    AMYLASE    Collection Time: 01/21/21  2:00 PM   Result Value Ref Range    Amylase 41 25 - 115 U/L   MAGNESIUM    Collection Time: 01/21/21  2:00 PM   Result Value Ref Range    Magnesium 2.4 1.6 - 2.6 mg/dL   RENAL FUNCTION PANEL    Collection Time: 01/21/21  2:00 PM   Result Value Ref Range    Sodium 136 136 - 145 mmol/L    Potassium 3.9 3.5 - 5.5 mmol/L    Chloride 111 100 - 111 mmol/L    CO2 11 (L) 21 - 32 mmol/L    Anion gap 14 3.0 - 18 mmol/L    Glucose 158 (H) 74 - 99 mg/dL    BUN 10 7.0 - 18 MG/DL    Creatinine 1.31 (H) 0.6 - 1.3 MG/DL    BUN/Creatinine ratio 8 (L) 12 - 20      GFR est AA >60 >60 ml/min/1.73m2    GFR est non-AA 60 (L) >60 ml/min/1.73m2    Calcium 7.5 (L) 8.5 - 10.1 MG/DL    Phosphorus 1.4 (L) 2.5 - 4.9 MG/DL    Albumin 3.2 (L) 3.4 - 5.0 g/dL   GLUCOSE, POC    Collection Time: 01/21/21  2:49 PM   Result Value Ref Range    Glucose (POC) 166 (H) 70 - 110 mg/dL   GLUCOSTABILIZER    Collection Time: 01/21/21  2:50 PM   Result Value Ref Range    Glucose 166 mg/dL    Insulin order 4.2 units/hour    Insulin adminstered 4.2 units/hour    Multiplier 0.040     Low target 140 mg/dL    High target 180 mg/dL    D50 order 0.0 ml    D50 administered 0.00 ml    Minutes until next BG 60 min    Order initials lmk     Administered initials lmk    GLUCOSE, POC    Collection Time: 01/21/21  3:49 PM   Result Value Ref Range    Glucose (POC) 180 (H) 70 - 110 mg/dL   GLUCOSTABILIZER    Collection Time: 01/21/21  3:51 PM   Result Value Ref Range    Glucose 180 mg/dL    Insulin order 4.8 units/hour    Insulin adminstered 4.8 units/hour    Multiplier 0.040     Low target 140 mg/dL    High target 180 mg/dL    D50 order 0.0 ml    D50 administered 0.00 ml    Minutes until next BG 60 min    Order initials lmk     Administered initials lmk    GLUCOSE, POC    Collection Time: 01/21/21  4:51 PM   Result Value Ref Range    Glucose (POC) 194 (H) 70 - 110 mg/dL   GLUCOSTABILIZER    Collection Time: 01/21/21  4:52 PM   Result Value Ref Range    Glucose 194 mg/dL    Insulin order 6.7 units/hour    Insulin adminstered 6.7 units/hour    Multiplier 0.050     Low target 140 mg/dL    High target 180 mg/dL    D50 order 0.0 ml    D50 administered 0.00 ml    Minutes until next BG 60 min    Order initials lmk     Administered initials moody    GLUCOSE, POC    Collection Time: 01/21/21  6:02 PM   Result Value Ref Range    Glucose (POC) 171 (H) 70 - 110 mg/dL   GLUCOSTABILIZER    Collection Time: 01/21/21  6:03 PM   Result Value Ref Range    Glucose 171 mg/dL    Insulin order 5.6 units/hour    Insulin adminstered 5.6 units/hour    Multiplier 0.050     Low target 140 mg/dL    High target 180 mg/dL    D50 order 0.0 ml    D50 administered 0.00 ml    Minutes until next BG 60 min    Order initials brittanyk     Administered initials brittanyk        Signed By: Asia Singh NP     January 21, 2021

## 2021-01-21 NOTE — ED PROVIDER NOTES
EMERGENCY DEPARTMENT HISTORY AND PHYSICAL EXAM    11:21 PM  Date: 1/20/2021  Patient Name: Ansley Blankenship    History of Presenting Illness     Chief Complaint   Patient presents with    Abdominal Pain       History Provided By: patient     HPI: Ansley Blankenship is a 39 y.o. male presents with abdominal pain and nausea Patient was diagnosed yesterday with diabetes and started taking Metformin. States that his abdominal pain is diffuse, constant, no radiation, moderate in severity. PCP: No primary care provider on file. Past History     Past Medical History:  No past medical history on file. Past Surgical History:  No past surgical history on file. Family History:  No family history on file. Social History:  Social History     Tobacco Use    Smoking status: Not on file   Substance Use Topics    Alcohol use: Not on file    Drug use: Not on file       Allergies: Allergies   Allergen Reactions    Iodine Itching    Shellfish Derived Nausea and Vomiting       Review of Systems   Review of Systems   Constitutional: Negative for activity change, appetite change and chills. HENT: Negative for congestion, ear discharge, ear pain and sore throat. Eyes: Negative for photophobia and pain. Respiratory: Negative for cough and choking. Cardiovascular: Negative for palpitations and leg swelling. Gastrointestinal: Positive for nausea and vomiting. Negative for anal bleeding and rectal pain. Endocrine: Negative for polydipsia and polyuria. Genitourinary: Negative for genital sores and urgency. Musculoskeletal: Negative for arthralgias and myalgias. Neurological: Negative for dizziness, seizures and speech difficulty. Psychiatric/Behavioral: Negative for hallucinations, self-injury and suicidal ideas.         Physical Exam     Patient Vitals for the past 12 hrs:   Temp Pulse Resp BP SpO2   01/20/21 2318 -- -- -- -- 100 %   01/20/21 2257 98.1 °F (36.7 °C) (!) 127 (!) 31 (!) 174/99 99 %       Physical Exam  Vitals signs and nursing note reviewed. Constitutional:       Appearance: He is well-developed. He is ill-appearing and diaphoretic. Comments: Strong ketone smell   HENT:      Head: Normocephalic and atraumatic. Eyes:      General:         Right eye: No discharge. Left eye: No discharge. Neck:      Musculoskeletal: Normal range of motion and neck supple. Cardiovascular:      Rate and Rhythm: Normal rate and regular rhythm. Heart sounds: Normal heart sounds. No murmur. Pulmonary:      Effort: Pulmonary effort is normal. No respiratory distress. Breath sounds: Normal breath sounds. No stridor. No wheezing or rales. Chest:      Chest wall: No tenderness. Abdominal:      General: Bowel sounds are normal. There is no distension. Palpations: Abdomen is soft. Tenderness: There is no abdominal tenderness. There is no guarding or rebound. Musculoskeletal: Normal range of motion. Skin:     General: Skin is warm. Neurological:      Mental Status: He is alert and oriented to person, place, and time. Diagnostic Study Results     Labs -  Recent Results (from the past 12 hour(s))   EKG, 12 LEAD, INITIAL    Collection Time: 01/20/21 11:09 PM   Result Value Ref Range    Ventricular Rate 125 BPM    Atrial Rate 125 BPM    P-R Interval 128 ms    QRS Duration 86 ms    Q-T Interval 344 ms    QTC Calculation (Bezet) 496 ms    Calculated P Axis 57 degrees    Calculated R Axis 1 degrees    Calculated T Axis 59 degrees    Diagnosis       Sinus tachycardia  Otherwise normal ECG  No previous ECGs available         Radiologic Studies -   No results found. Medical Decision Making     ED Course: Progress Notes, Reevaluation, and Consults:    11:21 PM Initial assessment performed. The patients presenting problems have been discussed, and they/their family are in agreement with the care plan formulated and outlined with them.   I have encouraged them to ask questions as they arise throughout their visit.        Provider Notes (Medical Decision Making):     Patient presents in DKA  Tachycardic and tachypneic, alert and able to answer questions though  Diagnosed yesterday with diabetes by PMD and started on Metformin  pH of 7, anion gap of 27  Glucose of 404  Ketones in the urine  Ordered 3 L of fluids  Once 2 L are administered will start on insulin infusion  Discussed with Dr. Dexter ICU recommends repeat labs once patient has been on insulin infusion.    Will receive 1 dose of IV antibiotics, however I do not suspect sepsis  I suspect that leukocytosis and elevated lactic are secondary to stress response due to DKA. No obvious source of infection: x-ray chest no pneumonia, UA no UTI. Abdomen soft not tender.  Patient received 4 L of IV fluids and placed on insulin infusion drip  Repeat labs after 3 hours of insulin infusion not improved: pH of 6.98, anion gap of 24.    Discussed with Dr. Dexter. Patient will be admitted to ICU.    Vital Signs-Reviewed the patient's vital signs. Reviewed pt's pulse ox reading.   EKG as interpreted by me:  125, sinus tachycardia, no ST changes    Critical care:  CRITICAL CARE:    I have spent 60 minutes of critical care time involved in lab review, consultations with specialist, family decision-making, and documentation.  This time does not include separately billable procedures. During this entire length of time I was immediately available to the patient.    Critical Care:  The reason for providing this level of medical care for this critically ill patient was due a critical illness that impaired one or more vital organ systems such that there was a high probability of imminent or life threatening deterioration in the patients condition. This care involved high complexity decision making to assess, manipulate, and support vital system functions, to treat this degreee vital organ system failure and to prevent further life threatening deterioration of the  patients condition. Records Reviewed:  old medical records (Time of Review: 11:21 PM)  -I am the first provider for this patient.  -I reviewed the vital signs, available nursing notes, past medical history, past surgical history, family history and social history. Current Facility-Administered Medications   Medication Dose Route Frequency Provider Last Rate Last Admin    sodium chloride (NS) flush 5-10 mL  5-10 mL IntraVENous PRN Lilly Mauro MD        sodium chloride 0.9 % bolus infusion 3,000 mL  30 mL/kg (Order-Specific) IntraVENous Rosa Matute MD         Current Outpatient Medications   Medication Sig Dispense Refill    metFORMIN (GLUCOPHAGE) 500 mg tablet Take 500 mg by mouth two (2) times daily (with meals). Clinical Impression     Clinical Impression: No diagnosis found. Disposition: DKA    This note was dictated utilizing voice recognition software which may lead to typographical errors. I apologize in advance if the situation occurs. If questions arise please do not hesitate to contact me or call our department.     Milagro Jacques MD  11:21 PM

## 2021-01-21 NOTE — ED NOTES
TRANSFER - OUT REPORT:    Verbal report given to Tej Mccauley RN(name) on [facility]  being transferred to [facility], RN(unit) for routine progression of care       Report consisted of patients Situation, Background, Assessment and   Recommendations(SBAR). Information from the following report(s) SBAR, Kardex, MAR, Recent Results and Cardiac Rhythm Sinus Tachycardia was reviewed with the receiving nurse. Lines:   Peripheral IV 01/20/21 Right Antecubital (Active)   Site Assessment Clean, dry, & intact 01/20/21 2315   Phlebitis Assessment 0 01/20/21 2315   Infiltration Assessment 0 01/20/21 2315   Dressing Status Clean, dry, & intact 01/20/21 2315       Peripheral IV 01/20/21 Left Antecubital (Active)   Site Assessment Clean, dry, & intact 01/20/21 2345   Phlebitis Assessment 0 01/20/21 2345   Infiltration Assessment 0 01/20/21 2345        Opportunity for questions and clarification was provided. Patient transported with:   Monitor   Insulin Pump.

## 2021-01-21 NOTE — H&P
[facility]  Pulmonary, Critical Care, and Sleep Medicine    Name: Xiao Pimentel MRN: [id number]   : 1975 Hospital: [facility]   Date: 2021        Critical Care Initial Patient Consult    IMPRESSION:   · Diabetic Ketoacidosis w/ HAGMA- likely due to poorly managed undiagnosed Dx of DM vs Latent Autoimmune Diabetes New to Metformin OP, no Insulin use. Pancreatic Islet Cell Antibodies pending. HgbA1c: >14.  · SIRS- likely New Onset DM/ DKA vs Abdominal Process vs Resp. Process vs Pre-Renal RYAN. WBC: 20.5- upward trending. Bandemia: 7. BCx pending. CXR (-). RUQ + Epigastric pain- Abdominal KUB (-) for SBO. Lipase: 183. Abdominal US, Amylase, Resp Viral Panel pending. · Epigastric + RUQ Pain- likely Cholecystitis vs Cholangitis vs Pancreatitis vs SBO. Lipase: 183. Amylase, Abdominal US pending. KUB (-) for SBO. Cardiac Panel (-). Most likely due to DKA. · RYAN- BUN: 16, Cr: 4.5- Improving, UA (+) 100 Proteinuria, +1 Amorphous Crystals, 0 to 3 Hyaline Casts, 11-20 Granular Casts, Few Mucus. Potential ATN vs UTI   · Electrolyte Derangement- Hyponatremia- Na: 134. Hypocalcemia, Calcium: 7.6. Hyperphosphatemia, Phos: 5.9. Patient Active Problem List   Diagnosis Code    DKA (diabetic ketoacidoses) ([facility] [address] E11.10        RECOMMENDATIONS:   Neuro: Monitor Neuro checks. Resp: Resp Viral Panel + COVID-19 Test pending. CXR (-). Aspiration Precautions, HOB >30'. I/D: believe this to be SIRs at this time. Procal pending. WBC: 20.5. Trending WBCs + Temp. Heme/ Onc: WBC: 20.5. Procal pending. Monitor daily CBC w/ Diff, WBC, Procal, and Temp trends. CVS: Cardiac Panel (-) Monitor HD, MAP goal >65 mmHg. Metabolic: Lactic Acid: 1.2- improving, Bicarb x 1 dose. Mag replaced. Cont monitoring Daily BMP, Mag, Phos, and Electrolytes. Renal: RYAN, BUN: 16, Cr: 1.45- Improving, Receiving fluids. Trending renal parameters + Strict I&Os. Endocrine: D50 + Fluids for Hyperglycemia. Autoimmune diabetes panel pending. Monitor Q6 glucoses. GI: Clear Liquid Diet. Lipase: 183. KUB shows no SBO. Abdominal US + Amylase pending. Musc/Skin: No Acute Issues. Code Status: Full Code. Subjective/History: This patient has been seen and evaluated at the request of Dr. Micheal Rutledge for mgmt of Diabetic Ketoacidosis on 01/21/2021. Patient is a 39 y.o. AA Male w/ no PMH, other than recent Dx of DM II, who presented to [place] [address] ER for epigastric pain and nausea w/ vomiting on on 01/20/2021. Pt stated that prior to his admission he was experiencing increased polyuria (voiding x6-8 daily, x6 nightly) since December. He also c/o 25 lb weight loss over the last two months. He went to his family doctor who tested his urine and was diagnosed with \"prediabetes\"  And subsequently Rx Metformin PO. Following taking of metformin he developed acute abdominal pain. States pain is epigastric and RUQ. During his course in the ED, Critical Labs revealed severe DKA, Lactic Acidosis, Hyperglycemia, Electrolyte Derangements, and now meeting SIRS criteria. ROS(+) chills. Denies CP, SOB, edema, orthopnea, fevers, cough, sore throat, penile discharge. Family history of type II diabetes. Jeff Grey is his MPOA/ POC per patient. Pt was transferred to [facility] ICU on 01/21/2021 and is continued being monitored for DKA and receiving D5 and Fluids. No past medical history on file. No past surgical history on file. Prior to Admission medications    Medication Sig Start Date End Date Taking? Authorizing Provider   metFORMIN (GLUCOPHAGE) 500 mg tablet Take 500 mg by mouth two (2) times daily (with meals).    Yes Other, MD Evelyne     Current Facility-Administered Medications   Medication Dose Route Frequency    insulin regular (NOVOLIN R, HUMULIN R) 100 Units in 0.9% sodium chloride 100 mL infusion  0-50 Units/hr IntraVENous TITRATE    dextrose 5 % - 0.45% NaCl infusion  150 mL/hr IntraVENous CONTINUOUS  enoxaparin (LOVENOX) injection 40 mg  40 mg SubCUTAneous Q24H     Allergies   Allergen Reactions    Iodine Itching    Shellfish Derived Nausea and Vomiting      Social History     Tobacco Use    Smoking status: Not on file   Substance Use Topics    Alcohol use: Not on file      No family history on file.        Review of Systems:  Constitutional: negative for fevers, chills, sweats, fatigue and weight loss         Objective:   Vital Signs:    Visit Vitals  BP (!) 146/92   Pulse (!) 112   Temp 99.1 °F (37.3 °C)   Resp 24   Ht 5' 7\" (1.702 m)   Wt 102.7 kg (226 lb 6.6 oz)   SpO2 100%   BMI 35.46 kg/m²       O2 Device: Room air       Temp (24hrs), Av.2 °F (36.8 °C), Min:97.6 °F (36.4 °C), Max:99.1 °F (37.3 °C)         Intake/Output:   Last shift:       07 -  1900  In: 238.7 [I.V.:238.7]  Out: 850 [Urine:850]  Last 3 shifts: 1901 -  0700  In: 4400 [I.V.:4400]  Out: 1850 [Urine:1850]    Intake/Output Summary (Last 24 hours) at 2021 1414  Last data filed at 2021 1000  Gross per 24 hour   Intake 4638.66 ml   Output 2700 ml   Net 1938.66 ml       Physical Exam:  General:  A&Ox3, In no apparent distress, Laying comfortably in hospital bed   HENNT:  Normocephalic/ Atraumatic, No periorbital edema, Conjunctivae Pink & Moist B, No scleral icterus B, PERRLA, Nares w/out drainage, Nasal septum midline, No Frontal or Maxillary Sinus TTP, No oral ulcers or lesions, Uvula midline, Dentition adequate, No receeding gum lines, Neck supple, Thyroid w/out visible masses, No cervical lymphadenopathy    Back:   No visible trauma, Symmetric, No curvature, Normal ROM, No CVA TTP   Lungs:   Equal & symmetrical chest expansion, RR, CTAB   Heart:  No visible trauma to chest wall, Non-displaced PMI, Tachycardic, Regular Rhythm, No R/M/G   Abdomen:   Obese, Soft, Mildly distended, Normoactive bowel sounds x4 quads, Mild TTP to Epigastrium + RUQ, No TTP to RLQ/ LLQ, No rigidity, guarding, referred pain, No masses/ organomegaly appreaciated   Extremities:  UE/LW atraumatic, No Cyanosis, pitting edema, ulcers or brawny skin changes   Pulses: 2+ and Symmetric peripheral pulses.  No carotid aa bruits appreaciated   Skin: Serrato Type VI, Smooth skin texture, Normal skin turgor, No rashes or lesions          Lymph Nodes:  Cervical, supraclavicular, axillary nodes, and Inguinal nodes w/out lympadenopathy   Neurologic: CN II-XII intact, 5/5 strength in all planes, Babinski's reflex intact, Sharp & Dull senses intact to LE B       Data:     Recent Results (from the past 24 hour(s))   EKG, 12 LEAD, INITIAL    Collection Time: 01/20/21 11:09 PM   Result Value Ref Range    Ventricular Rate 125 BPM    Atrial Rate 125 BPM    P-R Interval 128 ms    QRS Duration 86 ms    Q-T Interval 344 ms    QTC Calculation (Bezet) 496 ms    Calculated P Axis 57 degrees    Calculated R Axis 1 degrees    Calculated T Axis 59 degrees    Diagnosis       Sinus tachycardia  Otherwise normal ECG  No previous ECGs available  Confirmed by Roz Kocher, MD, [place] ([id number]) on 1/21/2021 1:01:21 PM     POC LACTIC ACID    Collection Time: 01/20/21 11:26 PM   Result Value Ref Range    Lactic Acid (POC) 2.89 (HH) 0.40 - 2.00 mmol/L   CULTURE, BLOOD    Collection Time: 01/20/21 11:27 PM    Specimen: Blood   Result Value Ref Range    Special Requests: LEFT  Antecubital        Culture result: NO GROWTH AFTER 1 HOUR     CBC WITH AUTOMATED DIFF    Collection Time: 01/20/21 11:27 PM   Result Value Ref Range    WBC 20.5 (H) 4.6 - 13.2 K/uL    RBC 6.52 (H) 4.70 - 5.50 M/uL    HGB 17.4 (H) 13.0 - 16.0 g/dL    HCT 48.1 (H) 36.0 - 48.0 %    MCV 73.8 (L) 74.0 - 97.0 FL    MCH 26.7 24.0 - 34.0 PG    MCHC 36.2 31.0 - 37.0 g/dL    RDW 15.1 (H) 11.6 - 14.5 %    PLATELET 361 153 - 899 K/uL    NEUTROPHILS 85 (H) 42 - 75 %    BAND NEUTROPHILS 7 (H) 0 - 5 %    LYMPHOCYTES 5 (L) 20 - 51 %    MONOCYTES 2 2 - 9 %    EOSINOPHILS 0 0 - 5 %    BASOPHILS 0 0 - 3 %    METAMYELOCYTES 1 (H) 0 %    ABS. NEUTROPHILS 17.4 (H) 1.8 - 8.0 K/UL    ABS. LYMPHOCYTES 1.0 0.8 - 3.5 K/UL    ABS. MONOCYTES 0.4 0 - 1.0 K/UL    ABS. EOSINOPHILS 0.0 0.0 - 0.4 K/UL    ABS. BASOPHILS 0.0 0.0 - 0.1 K/UL    PLATELET COMMENTS LARGE PLATELETS      RBC COMMENTS ANISOCYTOSIS  1+        DF MANUAL     METABOLIC PANEL, COMPREHENSIVE    Collection Time: 01/20/21 11:27 PM   Result Value Ref Range    Sodium 125 (L) 136 - 145 mmol/L    Potassium 4.7 3.5 - 5.5 mmol/L    Chloride 91 (L) 100 - 111 mmol/L    CO2 7 (LL) 21 - 32 mmol/L    Anion gap 27 (H) 3.0 - 18 mmol/L    Glucose 404 (HH) 74 - 99 mg/dL    BUN 18 7.0 - 18 MG/DL    Creatinine 1.86 (H) 0.6 - 1.3 MG/DL    BUN/Creatinine ratio 10 (L) 12 - 20      GFR est AA 48 (L) >60 ml/min/1.73m2    GFR est non-AA 40 (L) >60 ml/min/1.73m2    Calcium 9.1 8.5 - 10.1 MG/DL    Bilirubin, total 1.0 0.2 - 1.0 MG/DL    ALT (SGPT) 22 16 - 61 U/L    AST (SGOT) 17 10 - 38 U/L    Alk. phosphatase 95 45 - 117 U/L    Protein, total 10.1 (H) 6.4 - 8.2 g/dL    Albumin 4.4 3.4 - 5.0 g/dL    Globulin 5.7 (H) 2.0 - 4.0 g/dL    A-G Ratio 0.8 0.8 - 1.7     POC VENOUS BLOOD GAS    Collection Time: 01/20/21 11:27 PM   Result Value Ref Range    Device: ROOM AIR      pH, venous (POC) 7.03 (LL) 7.32 - 7.42      pCO2, venous (POC) 18.3 (L) 41 - 51 MMHG    pO2, venous (POC) 44 (H) 25 - 40 mmHg    HCO3, venous (POC) 4.8 (L) 23.0 - 28.0 MMOL/L    sO2, venous (POC) 60 (L) 65 - 88 %    Base deficit, venous (POC) 24 mmol/L    Allens test (POC) N/A      Total resp.  rate 31      Site LEFT BRACHIAL      Patient temp. 98.3      Specimen type (POC) VENOUS BLOOD      Performed by Eusebio Ponce    MAGNESIUM    Collection Time: 01/20/21 11:27 PM   Result Value Ref Range    Magnesium 1.8 1.6 - 2.6 mg/dL   PHOSPHORUS    Collection Time: 01/20/21 11:27 PM   Result Value Ref Range    Phosphorus 5.9 (H) 2.5 - 4.9 MG/DL   HEMOGLOBIN A1C WITH EAG    Collection Time: 01/20/21 11:27 PM   Result Value Ref Range    Hemoglobin A1c >14.0 (H) 4.2 - 5.6 %    Est. average glucose Cannot be calculated mg/dL   GLUCOSE, POC    Collection Time: 01/20/21 11:33 PM   Result Value Ref Range    Glucose (POC) 423 (HH) 70 - 110 mg/dL   GLUCOSE, POC    Collection Time: 01/20/21 11:35 PM   Result Value Ref Range    Glucose (POC) 421 (HH) 70 - 110 mg/dL   CULTURE, BLOOD    Collection Time: 01/20/21 11:40 PM    Specimen: Blood   Result Value Ref Range    Special Requests: NO SPECIAL REQUESTS  LEFT  Antecubital        Culture result: NO GROWTH AFTER 1 HOUR     URINALYSIS W/ RFLX MICROSCOPIC    Collection Time: 01/21/21 12:20 AM   Result Value Ref Range    Color YELLOW      Appearance CLEAR      Specific gravity 1.028 1.005 - 1.030      pH (UA) 5.0 5.0 - 8.0      Protein 100 (A) NEG mg/dL    Glucose >1,000 (A) NEG mg/dL    Ketone >160 (A) NEG mg/dL    Bilirubin Negative NEG      Blood MODERATE (A) NEG      Urobilinogen 0.2 0.2 - 1.0 EU/dL    Nitrites Negative NEG      Leukocyte Esterase Negative NEG     URINE MICROSCOPIC ONLY    Collection Time: 01/21/21 12:20 AM   Result Value Ref Range    WBC 0 to 3 0 - 4 /hpf    RBC 4 to 10 0 - 5 /hpf    Epithelial cells FEW 0 - 5 /lpf    Bacteria Negative NEG /hpf    Mucus FEW (A) NEG /lpf    Amorphous Crystals 1+ (A) NEG    Hyaline cast 0 to 3 0 - 2 /lpf    Granular cast 11 to 20 NEG /lpf   GLUCOSE, POC    Collection Time: 01/21/21 12:55 AM   Result Value Ref Range    Glucose (POC) 387 (H) 70 - 110 mg/dL   GLUCOSTABILIZER    Collection Time: 01/21/21 12:59 AM   Result Value Ref Range    Glucose 387 mg/dL    Insulin order 6.5 units/hour    Insulin adminstered 6.5 units/hour    Multiplier 0.020     Low target 140 mg/dL    High target 180 mg/dL    D50 order 0.0 ml    D50 administered 0.00 ml    Minutes until next BG 60 min    Order initials rjs     Administered initials rjs    POC LACTIC ACID    Collection Time: 01/21/21  1:28 AM   Result Value Ref Range    Lactic Acid (POC) 2.01 (HH) 0.40 - 2.00 mmol/L   GLUCOSE, POC    Collection Time: 01/21/21  2:05 AM   Result Value Ref Range    Glucose (POC) 336 (H) 70 - 110 mg/dL   GLUCOSTABILIZER    Collection Time: 01/21/21  2:11 AM   Result Value Ref Range    Glucose 336 mg/dL    Insulin order 8.3 units/hour    Insulin adminstered 8.3 units/hour    Multiplier 0.030     Low target 140 mg/dL    High target 180 mg/dL    D50 order 0.0 ml    D50 administered 0.00 ml    Minutes until next BG 60 min    Order initials rjs     Administered initials rjs    GLUCOSE, POC    Collection Time: 01/21/21  3:09 AM   Result Value Ref Range    Glucose (POC) 282 (H) 70 - 110 mg/dL   GLUCOSTABILIZER    Collection Time: 01/21/21  3:11 AM   Result Value Ref Range    Glucose 282 mg/dL    Insulin order 8.9 units/hour    Insulin adminstered 8.9 units/hour    Multiplier 0.040     Low target 140 mg/dL    High target 180 mg/dL    D50 order 0.0 ml    D50 administered 0.00 ml    Minutes until next BG 60 min    Order initials jpb     Administered initials jpb    POC LACTIC ACID    Collection Time: 01/21/21  3:25 AM   Result Value Ref Range    Lactic Acid (POC) 1.18 0.40 - 2.00 mmol/L   POC VENOUS BLOOD GAS    Collection Time: 01/21/21  3:31 AM   Result Value Ref Range    Device: ROOM AIR      FIO2 (POC) 99 %    pH, venous (POC) 6.98 (LL) 7.32 - 7.42      pCO2, venous (POC) 14.1 (L) 41 - 51 MMHG    pO2, venous (POC) 65 (H) 25 - 40 mmHg    HCO3, venous (POC) 3.4 (L) 23.0 - 28.0 MMOL/L    sO2, venous (POC) 80 65 - 88 %    Base deficit, venous (POC) 27 mmol/L    Allens test (POC) N/A      Total resp.  rate 39      Site [facility]      Patient temp. 97.8      Specimen type (POC) VENOUS BLOOD      Performed by [address] [place] [address], [facility]    Collection Time: 01/21/21  3:33 AM   Result Value Ref Range    Sodium 134 (L) 136 - 145 mmol/L    Potassium 4.7 3.5 - 5.5 mmol/L    Chloride 104 100 - 111 mmol/L    CO2 6 (LL) 21 - 32 mmol/L    Anion gap 24 (H) 3.0 - 18 mmol/L    Glucose 245 (H) 74 - 99 mg/dL    BUN 16 7.0 - 18 MG/DL Creatinine 1.45 (H) 0.6 - 1.3 MG/DL    BUN/Creatinine ratio 11 (L) 12 - 20      GFR est AA >60 >60 ml/min/1.73m2    GFR est non-AA 53 (L) >60 ml/min/1.73m2    Calcium 7.6 (L) 8.5 - 10.1 MG/DL   MAGNESIUM    Collection Time: 01/21/21  3:33 AM   Result Value Ref Range    Magnesium 1.7 1.6 - 2.6 mg/dL   GLUCOSE, POC    Collection Time: 01/21/21  4:14 AM   Result Value Ref Range    Glucose (POC) 210 (H) 70 - 110 mg/dL   GLUCOSTABILIZER    Collection Time: 01/21/21  4:16 AM   Result Value Ref Range    Glucose 210 mg/dL    Insulin order 7.5 units/hour    Insulin adminstered 7.5 units/hour    Multiplier 0.050     Low target 140 mg/dL    High target 180 mg/dL    D50 order 0.0 ml    D50 administered 0.00 ml    Minutes until next BG 60 min    Order initials rjs     Administered initials rjs    GLUCOSE, POC    Collection Time: 01/21/21  5:16 AM   Result Value Ref Range    Glucose (POC) 187 (H) 70 - 110 mg/dL   GLUCOSTABILIZER    Collection Time: 01/21/21  5:21 AM   Result Value Ref Range    Glucose 187 mg/dL    Insulin order 7.6 units/hour    Insulin adminstered 7.6 units/hour    Multiplier 0.060     Low target 140 mg/dL    High target 180 mg/dL    D50 order 0.0 ml    D50 administered 0.00 ml    Minutes until next BG 60 min    Order initials jpb     Administered initials jpb    GLUCOSE, POC    Collection Time: 01/21/21  6:19 AM   Result Value Ref Range    Glucose (POC) 178 (H) 70 - 110 mg/dL   GLUCOSTABILIZER    Collection Time: 01/21/21  6:20 AM   Result Value Ref Range    Glucose 178 mg/dL    Insulin order 7.1 units/hour    Insulin adminstered 7.1 units/hour    Multiplier 0.060     Low target 140 mg/dL    High target 180 mg/dL    D50 order 0.0 ml    D50 administered 0.00 ml    Minutes until next BG 60 min    Order initials jpb     Administered initials jpb    GLUCOSE, POC    Collection Time: 01/21/21  8:18 AM   Result Value Ref Range    Glucose (POC) 172 (H) 70 - 110 mg/dL   GLUCOSTABILIZER    Collection Time: 01/21/21  8:33 AM   Result Value Ref Range    Glucose 172 mg/dL    Insulin order 2.2 units/hour    Insulin adminstered 2.2 units/hour    Multiplier 0.020     Low target 140 mg/dL    High target 180 mg/dL    D50 order 0.0 ml    D50 administered 0.00 ml    Minutes until next BG 60 min    Order initials lmk     Administered initials lmk    LACTIC ACID    Collection Time: 01/21/21  9:40 AM   Result Value Ref Range    Lactic acid 1.2 0.4 - 2.0 MMOL/L   CARDIAC PANEL,(CK, CKMB & TROPONIN)    Collection Time: 01/21/21  9:40 AM   Result Value Ref Range    CK - MB 1.3 <3.6 ng/ml    CK-MB Index 0.4 0.0 - 4.0 %     39 - 308 U/L    Troponin-I, QT <0.02 0.0 - 0.045 NG/ML   LIPASE    Collection Time: 01/21/21  9:40 AM   Result Value Ref Range    Lipase 183 73 - 393 U/L   GLUCOSE, POC    Collection Time: 01/21/21 10:02 AM   Result Value Ref Range    Glucose (POC) 170 (H) 70 - 110 mg/dL   GLUCOSTABILIZER    Collection Time: 01/21/21 10:02 AM   Result Value Ref Range    Glucose 170 mg/dL    Insulin order 2.2 units/hour    Insulin adminstered 2.2 units/hour    Multiplier 0.020     Low target 140 mg/dL    High target 180 mg/dL    D50 order 0.0 ml    D50 administered 0.00 ml    Minutes until next BG 60 min    Order initials lmk     Administered initials lmk    RESPIRATORY VIRUS PANEL W/COVID-19, PCR    Collection Time: 01/21/21 10:13 AM    Specimen: Nasopharyngeal   Result Value Ref Range    Adenovirus Not detected NOTD      Coronavirus 229E Not detected NOTD      Coronavirus HKU1 Not detected NOTD      Coronavirus CVNL63 Not detected NOTD      Coronavirus OC43 Not detected NOTD      Metapneumovirus Not detected NOTD      Rhinovirus and Enterovirus Not detected NOTD      Influenza A Not detected NOTD      Influenza B Not detected NOTD      Parainfluenza 1 Not detected NOTD      Parainfluenza 2 Not detected NOTD      Parainfluenza 3 Not detected NOTD      Parainfluenza virus 4 Not detected NOTD      RSV by PCR Not detected NOTD      B. parapertussis, PCR Not detected NOTD      Bordetella pertussis - PCR Not detected NOTD      Chlamydophila pneumoniae DNA, QL, PCR Not detected NOTD      Mycoplasma pneumoniae DNA, QL, PCR Not detected NOTD      SARS-CoV-2, PCR Not detected NOTD     GLUCOSE, POC    Collection Time: 01/21/21 10:24 AM   Result Value Ref Range    Glucose (POC) 198 (H) 70 - 110 mg/dL   GLUCOSE, POC    Collection Time: 01/21/21 11:12 AM   Result Value Ref Range    Glucose (POC) 227 (H) 70 - 110 mg/dL   GLUCOSTABILIZER    Collection Time: 01/21/21 11:13 AM   Result Value Ref Range    Glucose 227 mg/dL    Insulin order 5.0 units/hour    Insulin adminstered 5.0 units/hour    Multiplier 0.030     Low target 140 mg/dL    High target 180 mg/dL    D50 order 0.0 ml    D50 administered 0.00 ml    Minutes until next BG 60 min    Order initials lmk     Administered initials lmk    GLUCOSE, POC    Collection Time: 01/21/21 12:12 PM   Result Value Ref Range    Glucose (POC) 204 (H) 70 - 110 mg/dL   GLUCOSTABILIZER    Collection Time: 01/21/21 12:38 PM   Result Value Ref Range    Glucose 204 mg/dL    Insulin order 5.8 units/hour    Insulin adminstered 5.8 units/hour    Multiplier 0.040     Low target 140 mg/dL    High target 180 mg/dL    D50 order 0.0 ml    D50 administered 0.00 ml    Minutes until next BG 60 min    Order initials lmk     Administered initials lmk    GLUCOSE, POC    Collection Time: 01/21/21  1:42 PM   Result Value Ref Range    Glucose (POC) 170 (H) 70 - 110 mg/dL   GLUCOSTABILIZER    Collection Time: 01/21/21  1:43 PM   Result Value Ref Range    Glucose 170 mg/dL    Insulin order 4.4 units/hour    Insulin adminstered 4.4 units/hour    Multiplier 0.040     Low target 140 mg/dL    High target 180 mg/dL    D50 order 0.0 ml    D50 administered 0.00 ml    Minutes until next BG 60 min    Order initials lmk     Administered initials lmk              Recent Labs     01/21/21  0331   FIO2I 99       Telemetry: Sinus Tachycardia Imaging:  I have personally reviewed the patients radiographs and have reviewed the reports:  CXR Results  (Last 48 hours)               01/20/21 2345  XR CHEST PORT Final result    Impression:      Negative chest.           Narrative:  EXAM: CHEST ONE VIEW  portable 2337 hours       CLINICAL HISTORY/INDICATION: meets SIRS criteria , nausea, abdominal pain,   vomiting, tachycardia, tachypnea, diagnosed with diabetes one day ago       COMPARISON: None. TECHNIQUE: One view obtained. FINDINGS:        The cardiac and mediastinal silhouette is normal. The lungs are clear. Pulmonary   vascularity is normal. The costophrenic angles are sharply defined. No bony   abnormalities are seen. Best Practice:  Glycemic Control  IHI ICU Bundles:    MVentilated patients- VAP bundle , aim to keep peak plateau pressure 78-66LN H2O  Sress Ulcer Prophylaxis  DVT Prophylaxis  Need for Lines, Alexander Assessed  Palliative Care Consult        Sinan Arroyo  01/21/21  Pulmonary, Critical Care Medicine  [facility]         [facility] Staff Addendum     I have independently evaluated the patient and reviewed the patient's chart. I have discussed the findings and care plan with ICU Care Team. Care Plan reviewed on ICU milti-D rounds. I agree with the above evaluation, assessment and recommendations along with the following comments and observations. Please also review my orders. Patient admitted to ICU overnight for severe metabolic acidosis from DKA. Currently  resting in bed at time of my evaluation earlier today. Infectious workup negative thus far. COVID-19 negative. Patient reports feeling tired and fatigue. No nausea or emesis. He does report feeling generalized abdominal discomfort. No localizing abdominal exam. No rebound, guarding or point tenderness. He does report that he has not had a BM since last week.  He states he administered an enema to himself yesterday prior to admission. Will given patient a soap suds enema. GAP has closed. Hypophosphatemia being replaced. Bicarb still low but improving. Continuing with insulin drip for now until bicarb improves. Patient is normal tensive. GAP is closed so he can start some PO. Continuing with electrolyte replacement as needed. At this time, OK to transfer patient to SDU for ongoing care and managment      Complex decision making was made in the evaluation and management plans during this consultation. More than 50% of time was spent in counseling and coordination of care including review of data and discussion with other team members. Further recommendations and therapies pending clinical course.     Critical Care and time spent coordinating care: physical exam, chart review, documentation, discussion with care team,  minus procedure time: 35 min    Benjamin Oden DO, [facility]  Pulmonary, Sleep and Critical Care Medicine  9:42 PM

## 2021-01-22 LAB
ADMINISTERED INITIALS, ADMINIT: NORMAL
ALBUMIN SERPL-MCNC: 2.8 G/DL (ref 3.4–5)
ALBUMIN SERPL-MCNC: 2.9 G/DL (ref 3.4–5)
ANION GAP SERPL CALC-SCNC: 11 MMOL/L (ref 3–18)
ANION GAP SERPL CALC-SCNC: 13 MMOL/L (ref 3–18)
ANION GAP SERPL CALC-SCNC: 14 MMOL/L (ref 3–18)
BASOPHILS # BLD: 0 K/UL (ref 0–0.06)
BASOPHILS NFR BLD: 0 % (ref 0–3)
BUN SERPL-MCNC: 4 MG/DL (ref 7–18)
BUN SERPL-MCNC: 4 MG/DL (ref 7–18)
BUN SERPL-MCNC: 6 MG/DL (ref 7–18)
BUN/CREAT SERPL: 4 (ref 12–20)
BUN/CREAT SERPL: 4 (ref 12–20)
BUN/CREAT SERPL: 5 (ref 12–20)
CALCIUM SERPL-MCNC: 7.6 MG/DL (ref 8.5–10.1)
CALCIUM SERPL-MCNC: 7.8 MG/DL (ref 8.5–10.1)
CALCIUM SERPL-MCNC: 8 MG/DL (ref 8.5–10.1)
CHLORIDE SERPL-SCNC: 108 MMOL/L (ref 100–111)
CHLORIDE SERPL-SCNC: 108 MMOL/L (ref 100–111)
CHLORIDE SERPL-SCNC: 109 MMOL/L (ref 100–111)
CO2 SERPL-SCNC: 15 MMOL/L (ref 21–32)
CO2 SERPL-SCNC: 17 MMOL/L (ref 21–32)
CO2 SERPL-SCNC: 18 MMOL/L (ref 21–32)
CREAT SERPL-MCNC: 1.07 MG/DL (ref 0.6–1.3)
CREAT SERPL-MCNC: 1.11 MG/DL (ref 0.6–1.3)
CREAT SERPL-MCNC: 1.18 MG/DL (ref 0.6–1.3)
D50 ADMINISTERED, D50ADM: 0 ML
D50 ORDER, D50ORD: 0 ML
DIFFERENTIAL METHOD BLD: ABNORMAL
EOSINOPHIL # BLD: 0 K/UL (ref 0–0.4)
EOSINOPHIL NFR BLD: 0 % (ref 0–5)
ERYTHROCYTE [DISTWIDTH] IN BLOOD BY AUTOMATED COUNT: 14.2 % (ref 11.6–14.5)
GAD65 AB SER-ACNC: <5 U/ML (ref 0–5)
GLUCOSE BLD STRIP.AUTO-MCNC: 137 MG/DL (ref 70–110)
GLUCOSE BLD STRIP.AUTO-MCNC: 150 MG/DL (ref 70–110)
GLUCOSE BLD STRIP.AUTO-MCNC: 151 MG/DL (ref 70–110)
GLUCOSE BLD STRIP.AUTO-MCNC: 157 MG/DL (ref 70–110)
GLUCOSE BLD STRIP.AUTO-MCNC: 167 MG/DL (ref 70–110)
GLUCOSE BLD STRIP.AUTO-MCNC: 169 MG/DL (ref 70–110)
GLUCOSE BLD STRIP.AUTO-MCNC: 175 MG/DL (ref 70–110)
GLUCOSE BLD STRIP.AUTO-MCNC: 176 MG/DL (ref 70–110)
GLUCOSE BLD STRIP.AUTO-MCNC: 194 MG/DL (ref 70–110)
GLUCOSE BLD STRIP.AUTO-MCNC: 201 MG/DL (ref 70–110)
GLUCOSE BLD STRIP.AUTO-MCNC: 211 MG/DL (ref 70–110)
GLUCOSE SERPL-MCNC: 149 MG/DL (ref 74–99)
GLUCOSE SERPL-MCNC: 157 MG/DL (ref 74–99)
GLUCOSE SERPL-MCNC: 219 MG/DL (ref 74–99)
GLUCOSE, GLC: 150 MG/DL
GLUCOSE, GLC: 151 MG/DL
GLUCOSE, GLC: 167 MG/DL
GLUCOSE, GLC: 169 MG/DL
GLUCOSE, GLC: 175 MG/DL
GLUCOSE, GLC: 176 MG/DL
GLUCOSE, GLC: 194 MG/DL
HCT VFR BLD AUTO: 33.3 % (ref 36–48)
HGB BLD-MCNC: 12.2 G/DL (ref 13–16)
HIGH TARGET, HITG: 180 MG/DL
INSULIN ADMINSTERED, INSADM: 2.7 UNITS/HOUR
INSULIN ADMINSTERED, INSADM: 2.7 UNITS/HOUR
INSULIN ADMINSTERED, INSADM: 3.2 UNITS/HOUR
INSULIN ADMINSTERED, INSADM: 3.3 UNITS/HOUR
INSULIN ADMINSTERED, INSADM: 3.5 UNITS/HOUR
INSULIN ADMINSTERED, INSADM: 3.5 UNITS/HOUR
INSULIN ADMINSTERED, INSADM: 4 UNITS/HOUR
INSULIN ORDER, INSORD: 2.7 UNITS/HOUR
INSULIN ORDER, INSORD: 2.7 UNITS/HOUR
INSULIN ORDER, INSORD: 3.2 UNITS/HOUR
INSULIN ORDER, INSORD: 3.3 UNITS/HOUR
INSULIN ORDER, INSORD: 3.5 UNITS/HOUR
INSULIN ORDER, INSORD: 3.5 UNITS/HOUR
INSULIN ORDER, INSORD: 4 UNITS/HOUR
LOW TARGET, LOT: 140 MG/DL
LYMPHOCYTES # BLD: 2.2 K/UL (ref 0.8–3.5)
LYMPHOCYTES NFR BLD: 24 % (ref 20–51)
MAGNESIUM SERPL-MCNC: 2.1 MG/DL (ref 1.6–2.6)
MAGNESIUM SERPL-MCNC: 2.3 MG/DL (ref 1.6–2.6)
MCH RBC QN AUTO: 26.6 PG (ref 24–34)
MCHC RBC AUTO-ENTMCNC: 36.6 G/DL (ref 31–37)
MCV RBC AUTO: 72.7 FL (ref 74–97)
MINUTES UNTIL NEXT BG, NBG: 120 MIN
MINUTES UNTIL NEXT BG, NBG: 120 MIN
MINUTES UNTIL NEXT BG, NBG: 60 MIN
MONOCYTES # BLD: 0.6 K/UL (ref 0–1)
MONOCYTES NFR BLD: 6 % (ref 2–9)
MULTIPLIER, MUL: 0.03
NEUTS BAND NFR BLD MANUAL: 3 % (ref 0–5)
NEUTS SEG # BLD: 6.5 K/UL (ref 1.8–8)
NEUTS SEG NFR BLD: 67 % (ref 42–75)
ORDER INITIALS, ORDINIT: NORMAL
PHOSPHATE SERPL-MCNC: 0.9 MG/DL (ref 2.5–4.9)
PHOSPHATE SERPL-MCNC: 0.9 MG/DL (ref 2.5–4.9)
PHOSPHATE SERPL-MCNC: 1 MG/DL (ref 2.5–4.9)
PLATELET # BLD AUTO: 134 K/UL (ref 135–420)
PLATELET COMMENTS,PCOM: ABNORMAL
POTASSIUM SERPL-SCNC: 3 MMOL/L (ref 3.5–5.5)
POTASSIUM SERPL-SCNC: 3 MMOL/L (ref 3.5–5.5)
POTASSIUM SERPL-SCNC: 3.6 MMOL/L (ref 3.5–5.5)
PROCALCITONIN SERPL-MCNC: 0.11 NG/ML
RBC # BLD AUTO: 4.58 M/UL (ref 4.7–5.5)
RBC MORPH BLD: ABNORMAL
SODIUM SERPL-SCNC: 135 MMOL/L (ref 136–145)
SODIUM SERPL-SCNC: 139 MMOL/L (ref 136–145)
SODIUM SERPL-SCNC: 139 MMOL/L (ref 136–145)
WBC # BLD AUTO: 9.3 K/UL (ref 4.6–13.2)

## 2021-01-22 PROCEDURE — 84145 PROCALCITONIN (PCT): CPT

## 2021-01-22 PROCEDURE — 85025 COMPLETE CBC W/AUTO DIFF WBC: CPT

## 2021-01-22 PROCEDURE — 80069 RENAL FUNCTION PANEL: CPT

## 2021-01-22 PROCEDURE — 82962 GLUCOSE BLOOD TEST: CPT

## 2021-01-22 PROCEDURE — 99233 SBSQ HOSP IP/OBS HIGH 50: CPT | Performed by: INTERNAL MEDICINE

## 2021-01-22 PROCEDURE — 2709999900 HC NON-CHARGEABLE SUPPLY

## 2021-01-22 PROCEDURE — 74011250636 HC RX REV CODE- 250/636: Performed by: INTERNAL MEDICINE

## 2021-01-22 PROCEDURE — 83735 ASSAY OF MAGNESIUM: CPT

## 2021-01-22 PROCEDURE — 74011000250 HC RX REV CODE- 250: Performed by: INTERNAL MEDICINE

## 2021-01-22 PROCEDURE — 36415 COLL VENOUS BLD VENIPUNCTURE: CPT

## 2021-01-22 PROCEDURE — 74011636637 HC RX REV CODE- 636/637: Performed by: INTERNAL MEDICINE

## 2021-01-22 PROCEDURE — 84100 ASSAY OF PHOSPHORUS: CPT

## 2021-01-22 PROCEDURE — 74011000258 HC RX REV CODE- 258: Performed by: NURSE PRACTITIONER

## 2021-01-22 PROCEDURE — 74011250637 HC RX REV CODE- 250/637: Performed by: INTERNAL MEDICINE

## 2021-01-22 PROCEDURE — 74011000258 HC RX REV CODE- 258: Performed by: EMERGENCY MEDICINE

## 2021-01-22 PROCEDURE — 65660000004 HC RM CVT STEPDOWN

## 2021-01-22 PROCEDURE — 74011250636 HC RX REV CODE- 250/636: Performed by: NURSE PRACTITIONER

## 2021-01-22 PROCEDURE — 74011250637 HC RX REV CODE- 250/637: Performed by: NURSE PRACTITIONER

## 2021-01-22 PROCEDURE — 80048 BASIC METABOLIC PNL TOTAL CA: CPT

## 2021-01-22 RX ORDER — LANOLIN ALCOHOL/MO/W.PET/CERES
400 CREAM (GRAM) TOPICAL DAILY
Status: COMPLETED | OUTPATIENT
Start: 2021-01-22 | End: 2021-01-23

## 2021-01-22 RX ORDER — INSULIN GLARGINE 100 [IU]/ML
15 INJECTION, SOLUTION SUBCUTANEOUS DAILY
Status: DISCONTINUED | OUTPATIENT
Start: 2021-01-22 | End: 2021-01-23

## 2021-01-22 RX ORDER — ASPIRIN 81 MG/1
81 TABLET ORAL DAILY
Status: DISCONTINUED | OUTPATIENT
Start: 2021-01-22 | End: 2021-01-23 | Stop reason: HOSPADM

## 2021-01-22 RX ORDER — MAGNESIUM SULFATE 100 %
4 CRYSTALS MISCELLANEOUS AS NEEDED
Status: DISCONTINUED | OUTPATIENT
Start: 2021-01-22 | End: 2021-01-23 | Stop reason: HOSPADM

## 2021-01-22 RX ORDER — POTASSIUM CHLORIDE 750 MG/1
20 TABLET, FILM COATED, EXTENDED RELEASE ORAL 2 TIMES DAILY
Status: DISCONTINUED | OUTPATIENT
Start: 2021-01-22 | End: 2021-01-23

## 2021-01-22 RX ORDER — MORPHINE SULFATE 2 MG/ML
1 INJECTION, SOLUTION INTRAMUSCULAR; INTRAVENOUS
Status: DISCONTINUED | OUTPATIENT
Start: 2021-01-22 | End: 2021-01-23 | Stop reason: HOSPADM

## 2021-01-22 RX ORDER — DEXTROSE 50 % IN WATER (D50W) INTRAVENOUS SYRINGE
25-50 AS NEEDED
Status: DISCONTINUED | OUTPATIENT
Start: 2021-01-22 | End: 2021-01-23 | Stop reason: HOSPADM

## 2021-01-22 RX ORDER — VANCOMYCIN HYDROCHLORIDE
1250 EVERY 12 HOURS
Status: DISCONTINUED | OUTPATIENT
Start: 2021-01-22 | End: 2021-01-23

## 2021-01-22 RX ORDER — INSULIN LISPRO 100 [IU]/ML
INJECTION, SOLUTION INTRAVENOUS; SUBCUTANEOUS
Status: DISCONTINUED | OUTPATIENT
Start: 2021-01-22 | End: 2021-01-23 | Stop reason: HOSPADM

## 2021-01-22 RX ORDER — ATORVASTATIN CALCIUM 10 MG/1
10 TABLET, FILM COATED ORAL
Status: DISCONTINUED | OUTPATIENT
Start: 2021-01-22 | End: 2021-01-23 | Stop reason: HOSPADM

## 2021-01-22 RX ADMIN — DEXTROSE MONOHYDRATE AND SODIUM CHLORIDE 150 ML/HR: 5; .45 INJECTION, SOLUTION INTRAVENOUS at 00:29

## 2021-01-22 RX ADMIN — Medication 10 ML: at 22:00

## 2021-01-22 RX ADMIN — ASPIRIN 81 MG: 81 TABLET, COATED ORAL at 11:27

## 2021-01-22 RX ADMIN — PIPERACILLIN AND TAZOBACTAM 3.38 G: 3; .375 INJECTION, POWDER, LYOPHILIZED, FOR SOLUTION INTRAVENOUS at 21:27

## 2021-01-22 RX ADMIN — PIPERACILLIN AND TAZOBACTAM 3.38 G: 3; .375 INJECTION, POWDER, LYOPHILIZED, FOR SOLUTION INTRAVENOUS at 07:30

## 2021-01-22 RX ADMIN — DEXTROSE MONOHYDRATE AND SODIUM CHLORIDE 150 ML/HR: 5; .45 INJECTION, SOLUTION INTRAVENOUS at 09:15

## 2021-01-22 RX ADMIN — FAMOTIDINE 20 MG: 20 TABLET, FILM COATED ORAL at 09:18

## 2021-01-22 RX ADMIN — VANCOMYCIN HYDROCHLORIDE 1250 MG: 10 INJECTION, POWDER, LYOPHILIZED, FOR SOLUTION INTRAVENOUS at 00:10

## 2021-01-22 RX ADMIN — INSULIN LISPRO 4 UNITS: 100 INJECTION, SOLUTION INTRAVENOUS; SUBCUTANEOUS at 21:27

## 2021-01-22 RX ADMIN — ENOXAPARIN SODIUM 40 MG: 40 INJECTION SUBCUTANEOUS at 11:27

## 2021-01-22 RX ADMIN — POTASSIUM PHOSPHATE, MONOBASIC POTASSIUM PHOSPHATE, DIBASIC: 224; 236 INJECTION, SOLUTION, CONCENTRATE INTRAVENOUS at 12:54

## 2021-01-22 RX ADMIN — INSULIN LISPRO 4 UNITS: 100 INJECTION, SOLUTION INTRAVENOUS; SUBCUTANEOUS at 16:30

## 2021-01-22 RX ADMIN — VANCOMYCIN HYDROCHLORIDE 1250 MG: 10 INJECTION, POWDER, LYOPHILIZED, FOR SOLUTION INTRAVENOUS at 12:55

## 2021-01-22 RX ADMIN — PIPERACILLIN AND TAZOBACTAM 3.38 G: 3; .375 INJECTION, POWDER, LYOPHILIZED, FOR SOLUTION INTRAVENOUS at 12:53

## 2021-01-22 RX ADMIN — Medication 400 MG: at 11:27

## 2021-01-22 RX ADMIN — ATORVASTATIN CALCIUM 10 MG: 10 TABLET, FILM COATED ORAL at 21:27

## 2021-01-22 RX ADMIN — PIPERACILLIN AND TAZOBACTAM 3.38 G: 3; .375 INJECTION, POWDER, LYOPHILIZED, FOR SOLUTION INTRAVENOUS at 01:45

## 2021-01-22 RX ADMIN — INSULIN GLARGINE 15 UNITS: 100 INJECTION, SOLUTION SUBCUTANEOUS at 11:28

## 2021-01-22 RX ADMIN — POTASSIUM CHLORIDE 20 MEQ: 750 TABLET, FILM COATED, EXTENDED RELEASE ORAL at 11:27

## 2021-01-22 RX ADMIN — POTASSIUM CHLORIDE 20 MEQ: 750 TABLET, FILM COATED, EXTENDED RELEASE ORAL at 17:51

## 2021-01-22 NOTE — PROGRESS NOTES
Physician Progress Note      Redd Bonds  CSN #:                  [id number]  :                       1975  ADMIT DATE:       2021 10:55 PM  [address] [place] DATE:  RESPONDING  PROVIDER #:        CRISTOBAL Bueno MD          QUERY TEXT:    Patient admitted with DKA and noted to have SIRS. If possible, please document in progress notes and discharge summary if you are evaluating and/or treating any of the following: The medical record reflects the following:  Risk Factors: DKA; RYAN  Clinical Indicators: 's; RR 30's; WBC 20.5; bands 7  Treatment: IVF; insulin gtt; electrolyte monitoring    Thank you,  Mika Olivarez RN/CCDS  [phone]  Options provided:  -- SIRS of non-infectious origin due to DKA with RYAN  -- SIRS of non-infectious origin due to DKA without acute organ dysfunction  -- Other - I will add my own diagnosis  -- Disagree - Not applicable / Not valid  -- Disagree - Clinically unable to determine / Unknown  -- Refer to Clinical Documentation Reviewer    PROVIDER RESPONSE TEXT:    This patient has SIRS of non-infectious origin due to DKA without acute organ dysfunction.     Query created by: Ashlyn Syed on 2021 1:24 PM      Electronically signed by:  [facility] MD Caity Bueno MD 2021 6:23 PM

## 2021-01-22 NOTE — DIABETES MGMT
Diabetes Patient/Family Education Record  Factors That  May Influence Patients Ability  to Learn or  Comply with Recommendations   []   Language barrier    []   Cultural needs   []   Motivation    []   Cognitive limitation    []   Physical   [x]   Education - new diagnosis     []   Physiological factors   []   Hearing/vision/speaking impairment   []   Shinto beliefs    []   Financial factors   []  Other:   []  No factors identified at this time.     Person Instructed:   [x]   Patient   []   Family   []  Other     Preference for Learning:   [x]   Verbal   [x]   Written - printed material for DM education, meal planning, BG targets, A1c   [x]  Demonstration - meter and insulin administration      Level of Comprehension & Competence:    [x]  Good                                      [] Fair                                     []  Poor                             []  Needs Reinforcement   [x]  Teachback completed - BG monitoring, importance of diet, medication and exercise     Education Component:  new dx - see DM note    [x]  Medication management - educated about metformin and extended release form - provided education re: insulins - basal and mealtime - and administration for both vial/syringe and insulin pen   [x]  Nutritional management - typically 1-2 meals/day - provided education and printed materials for DM meal planning, portions and plate method.  Encouraged to consume 3-4 smaller meals / daily    [x]  Exercise   [x]  Signs, symptoms, and treatment of hyperglycemia and hypoglycemia   [x] Prevention, recognition and treatment of hyperglycemia and hypoglycemia   [x]  Importance of blood glucose monitoring - provided a Contour meter and education   [x]  Instruction on use of the blood glucose meter - educated about when to monitor BG and targets    [x]  Discuss the importance of HbA1C monitoring  - reviewed current A1c >14% and goal of 7%   []  Sick day guidelines   [x]  Proper use  and disposal of lancets, needles, syringes or insulin pens (if appropriate)   [x]  Potential long-term complications (retinopathy, kidney disease, neuropathy, foot care)   [x] Information about whom to contact in case of emergency or for more information   to f/u with new PCP   [x]  Goal:  Patient/family will demonstrate understanding of Diabetes Self Management Skills by: (date)  1/26/2021___  Plan for post-discharge education or self-management support:    [] Outpatient class schedule provided            [] Patient Declined    [] Scheduled for outpatient classes (date) _______  Verify:  Does patient understand how diabetes medications work? ____new dx. Provided education_____  Does patient know what their most recent A1c is? ___educatad today re:A1c and goal of 7%______________  Does patient monitor glucose at home? ___new dx. Provided Contour meter__________  Does patient have difficulty obtaining diabetes medications or testing supplies?  _new dx._________       Eben Hadley MPH RN CDE  Pager [phone]

## 2021-01-22 NOTE — PROGRESS NOTES
[facility]- Initial Progress Note    Pharmacy Consult ordered by Twylla Nissen     Indication: sepsis    Patient clinical status and labs ordered/reviewed. Pt Weight Weight: 102.7 kg (226 lb 6.6 oz)   Serum Creatinine Lab Results   Component Value Date/Time    Creatinine 1.29 01/21/2021 09:20 PM       Creatinine Clearance Estimated Creatinine Clearance: 84.3 mL/min (based on SCr of 1.29 mg/dL).    BUN Lab Results   Component Value Date/Time    BUN 7 01/21/2021 09:20 PM       WBC Lab Results   Component Value Date/Time    WBC 11.3 01/21/2021 09:20 PM      Temperature Temp: 99.2 °F (37.3 °C)   HR Pulse (Heart Rate): (!) 109     BP BP: 139/84           Dose Vancomycin 1250 mg IV q16 hours    PrPk=35 mcg/mL, PrTr=15 mcg/mL, IIB=400           Daily BMP    Continue to monitor    Sign: Michael Weldon PHARMD  Date: 1/21/2021  Time: 10:52 PM

## 2021-01-22 NOTE — PROGRESS NOTES
Hospitalist Progress Note    Patient: Marlen Richter Age: 39 y.o. : 1975 MR#: [id number] SSN: [id number]  Date/Time: 2021 9:24 AM    DOA: 2021  PCP: None    Subjective:     Feels much better today. No nausea/vomiting. Tolerates food intake   Still with lower left abdominal pain   No diarrhea. No fever. Gap closed       Interval Hospital Course:        ROS:   No current fever/chills, no headache, no dizziness, no facial pain, no sinus congestion,   No swallowing pain, No chest pain, no palpitation, no shortness of breath, no abd pain,  No diarrhea, no urinary complaint, no leg pain or swelling    Assessment/Plan:     1. DKA likely due to poorly managed DM v latent autoimmune DM  2. Lactic acidosis due to DKA  3. SIRS likely due to DKA without organ failure   4. Leukocytosis, resolved    5. Epigastric/RUQ pain likely cholecystitis v cholangitis v pancreatitis v SBO  6. Acute renal insufficiency, prerenal, resolved   7. Hyponatremia/hypocalcemia/hypophasphatemia    Cont on IV antibiotics, can wean off tomorrow if no fever, no Leukocytosis, follow up on blood culture   Stop Insulin gtt. Start Lantus and ISS   Advance diet .    Need diabetic education, will need insulin at discharge   pepcid         Additional Notes:    Time spent >35 minutes     Case discussed with:  [x]Patient  []Family  [x]Nursing  [x]Case Management  DVT Prophylaxis:  [x]Lovenox  []Hep SQ  []SCDs  []Coumadin   []On Heparin gtt    Signed By: Marquis Apryl MD     2021 9:24 AM              Objective:   VS:   Visit Vitals  /82 (BP 1 Location: Left arm, BP Patient Position: At rest)   Pulse 98   Temp 98.2 °F (36.8 °C)   Resp 20   Ht 5' 7\" (1.702 m)   Wt 105.5 kg (232 lb 8 oz)   SpO2 99%   BMI 36.41 kg/m²      Tmax/24hrs: Temp (24hrs), Av.9 °F (37.2 °C), Min:98.2 °F (36.8 °C), Max:99.5 °F (37.5 °C)      Intake/Output Summary (Last 24 hours) at 2021 0924  Last data filed at 2021 0805  Gross per 24 hour   Intake 2169.46 ml   Output 3300 ml   Net -1130.54 ml       Tele: sinus   General:  Cooperative, Not in acute distress, speaks in full sentence while in bed  HEENT: PERRL, EOMI, supple neck, no JVD, dry oral mucosa  Cardiovascular: S1S2 regular, no rub/gallop   Pulmonary: Clear air entry bilaterally, no wheezing, no crackle  GI:  Soft, non tender, non distended, +bs, no guarding   Extremities:  No pedal edema, +distal pulses appreciated   Neuro: AOx3, moving all extremities, no gross deficit.      Additional:       Current Facility-Administered Medications   Medication Dose Route Frequency    insulin glargine (LANTUS) injection 15 Units  15 Units SubCUTAneous DAILY    insulin lispro (HUMALOG) injection   SubCUTAneous AC&HS    glucose chewable tablet 16 g  4 Tab Oral PRN    glucagon (GLUCAGEN) injection 1 mg  1 mg IntraMUSCular PRN    dextrose (D50W) injection syrg 12.5-25 g  25-50 mL IntraVENous PRN    aspirin delayed-release tablet 81 mg  81 mg Oral DAILY    atorvastatin (LIPITOR) tablet 10 mg  10 mg Oral QHS    potassium chloride SR (KLOR-CON 10) tablet 20 mEq  20 mEq Oral BID    magnesium oxide (MAG-OX) tablet 400 mg  400 mg Oral DAILY    potassium phosphate 20 mmol in 0.9% sodium chloride 500 mL infusion   IntraVENous ONCE    morphine injection 1 mg  1 mg IntraVENous Q4H PRN    dextrose 5 % - 0.45% NaCl infusion  75 mL/hr IntraVENous CONTINUOUS    enoxaparin (LOVENOX) injection 40 mg  40 mg SubCUTAneous Q24H    piperacillin-tazobactam (ZOSYN) 3.375 g in 0.9% sodium chloride (MBP/ADV) 100 mL MBP  3.375 g IntraVENous Q6H    famotidine (PEPCID) tablet 20 mg  20 mg Oral DAILY    morphine injection 2 mg  2 mg IntraVENous Q4H PRN    ondansetron (ZOFRAN) injection 4 mg  4 mg IntraVENous Q6H PRN    vancomycin (VANCOCIN) 1250 mg in  ml infusion  1,250 mg IntraVENous Q16H    acetaminophen (TYLENOL) tablet 650 mg  650 mg Oral Q6H PRN    sodium chloride (NS) flush 5-10 mL  5-10 mL IntraVENous PRN Lab/Data Review:  Labs: Results:       Chemistry Recent Labs     01/22/21 0305 01/21/21 2120 01/21/21  1400   * 135* 158*   * 136 136   K 3.0* 3.5 3.9    107 111   CO2 15* 16* 11*   BUN 6* 7 10   CREA 1.18 1.29 1.31*   BUCR 5* 5* 8*   AGAP 11 13 14   CA 7.6* 7.5* 7.5*   PHOS 0.9* 1.6* 1.4*     Recent Labs     01/22/21 0305 01/21/21 2120 01/21/21  1400 01/20/21 2327   ALT  --   --   --  22   TP  --   --   --  10.1*   ALB 2.8* 3.3* 3.2* 4.4   GLOB  --   --   --  5.7*   AGRAT  --   --   --  0.8      CBC w/Diff Recent Labs     01/22/21 0305 01/21/21 2120 01/20/21 2327   WBC 9.3 11.3 20.5*   RBC 4.58* 5.26 6.52*   HGB 12.2* 14.2 17.4*   HCT 33.3* 38.5 48.1*   MCV 72.7* 73.2* 73.8*   MCH 26.6 27.0 26.7   MCHC 36.6 36.9 36.2   RDW 14.2 14.3 15.1*   * 148 191   BANDS 3 5 7*   GRANS 67 69 85*   LYMPH 24 20 5*   EOS 0 0 0      Coagulation No results for input(s): PTP, INR, APTT, INREXT in the last 72 hours.     Iron/Ferritin No results found for: IRON, FE, TIBC, IBCT, PSAT, FERR    BNP    Cardiac Enzymes Lab Results   Component Value Date/Time     01/21/2021 09:40 AM    CK - MB 1.3 01/21/2021 09:40 AM    CK-MB Index 0.4 01/21/2021 09:40 AM    Troponin-I, QT <0.02 01/21/2021 09:40 AM        Lactic Acid    Thyroid Studies          All Micro Results     Procedure Component Value Units Date/Time    CULTURE, BLOOD [[id number]] Collected: 01/20/21 2327    Order Status: Completed Specimen: Blood Updated: 01/22/21 0713     Special Requests: --        LEFT  Antecubital       Culture result: NO GROWTH 1 DAY       CULTURE, BLOOD [[id number]] Collected: 01/20/21 2340    Order Status: Completed Specimen: Blood Updated: 01/22/21 0713     Special Requests: --        NO SPECIAL REQUESTS  LEFT  Antecubital       Culture result: NO GROWTH 1 DAY       RESPIRATORY VIRUS PANEL W/COVID-19, PCR [[id number]] Collected: 01/21/21 1013    Order Status: Completed Specimen: Nasopharyngeal Updated: 01/21/21 1215 Adenovirus Not detected        Coronavirus 229E Not detected        Coronavirus HKU1 Not detected        Coronavirus CVNL63 Not detected        Coronavirus OC43 Not detected        Metapneumovirus Not detected        Rhinovirus and Enterovirus Not detected        Influenza A Not detected        Influenza B Not detected        Parainfluenza 1 Not detected        Parainfluenza 2 Not detected        Parainfluenza 3 Not detected        Parainfluenza virus 4 Not detected        RSV by PCR Not detected        B. parapertussis, PCR Not detected        Bordetella pertussis - PCR Not detected        Chlamydophila pneumoniae DNA, QL, PCR Not detected        Mycoplasma pneumoniae DNA, QL, PCR Not detected        SARS-CoV-2, PCR Not detected               Images:    CT (Most Recent). XRAY (Most Recent)      EKG No results found for this or any previous visit.      2D ECHO

## 2021-01-22 NOTE — DIABETES MGMT
Glycemic Control Plan of Care    Will need prescriptions for insulin and Contour test strips at discharge  Suggest changing metformin to extended release    Assessment: new DM dx. Has transitioned off insulin drip -   Received 15 units lantus -   Corrective lispro 4 times daily  He received approx.  79 units insulin in last 24 hours via [facility]  Provided DM education - Contour meter, insulin administration, DM meal planning  Reviewed DM medications - he understands he will need insulins at discharge   Suggest extended release metformin to avoid GI upset    Most recent blood glucose values:      Current A1C:  Lab Results   Component Value Date/Time    Hemoglobin A1c >14.0 (H) 01/20/2021 11:27 PM     Current hospital diabetes medications:  Lantus 15 units daily  Corrective lispro 4 times daily     TTD previous day:  approx 79 units regular insulin in past 24 hours     Home diabetes medications - per patient, just started taking 2 days ago   Metformin 500 mg bid    Goals:  Blood glucose will be within target range of  mg/dL by 1/26/2021     Education:  _X__  Refer to Diabetes Education Record             ___  Education not indicated at this time    Mathew Zazueta MPH RN CDE  Pager [phone]

## 2021-01-22 NOTE — ROUTINE PROCESS
Bedside and Verbal shift change report given to Mayela Nagy RN (oncoming nurse) by Matthew Gonzalez RN (offgoing nurse). Report included the following information SBAR, ED Summary, Intake/Output, MAR, Recent Results and Med Rec Status.

## 2021-01-22 NOTE — ROUTINE PROCESS
Bedside and Verbal shift change report given to Argenis Su RN (oncoming nurse) by Collette Stairs (offgoing nurse). Report included the following information SBAR, Kardex, MAR and Recent Results. SITUATION:    Code Status: Full Code   Reason for Admission: DKA (diabetic ketoacidoses) ([facility] [address] [E11.10]    [facility] day: 0   Problem List:       Hospital Problems  Never Reviewed          Codes Class Noted POA    DKA (diabetic ketoacidoses) ([facility] [address] ICD-10-CM: E11.10  ICD-9-CM: 250.12  1/21/2021 Unknown              BACKGROUND:    Past Medical History:   Past Medical History:   Diagnosis Date    Diabetes mellitus ([facility] [address] 01/21/2021         Patient taking anticoagulants yes (lovenox while IP for dvt prophylaxis)     IV and DRAINS (will only show if present)   Peripheral IV 01/20/21 Left Antecubital-Site Assessment: Clean, dry, & intact  Peripheral IV 01/20/21 Right Antecubital-Site Assessment: Clean, dry, & intact       Last 3 Weights:  Last 3 Recorded Weights in this Encounter    01/21/21 0256 01/21/21 0709   Weight: 99.8 kg (220 lb) 102.7 kg (226 lb 6.6 oz)     Weight change:        LAB RESULTS     Recent Labs     01/20/21  2327   WBC 20.5*   HGB 17.4*   HCT 48.1*           Recent Labs     01/21/21  1400 01/21/21  0333 01/20/21  2327    134* 125*   K 3.9 4.7 4.7   * 245* 404*   BUN 10 16 18   CREA 1.31* 1.45* 1.86*   CA 7.5* 7.6* 9.1   MG 2.4 1.7 1.8           Pending tests/procedures/ Plan of Care or Other Needs: CBC, renal panel due @ 8pm        ____      1905: Pt c/o continued severe ABD pain. While waiting for transport to arrive patient was provided with Morphine 2mg, Zofran 4mg, hourly blood glucose check and insulin drip adjustment, and ABX (Zosyn) hung.

## 2021-01-23 VITALS
BODY MASS INDEX: 35.31 KG/M2 | TEMPERATURE: 98.6 F | SYSTOLIC BLOOD PRESSURE: 135 MMHG | HEART RATE: 112 BPM | HEIGHT: 67 IN | RESPIRATION RATE: 20 BRPM | WEIGHT: 225 LBS | OXYGEN SATURATION: 99 % | DIASTOLIC BLOOD PRESSURE: 91 MMHG

## 2021-01-23 LAB
ANION GAP SERPL CALC-SCNC: 10 MMOL/L (ref 3–18)
ANION GAP SERPL CALC-SCNC: 12 MMOL/L (ref 3–18)
BUN SERPL-MCNC: 4 MG/DL (ref 7–18)
BUN SERPL-MCNC: 4 MG/DL (ref 7–18)
BUN/CREAT SERPL: 4 (ref 12–20)
BUN/CREAT SERPL: 4 (ref 12–20)
CALCIUM SERPL-MCNC: 8 MG/DL (ref 8.5–10.1)
CALCIUM SERPL-MCNC: 8.5 MG/DL (ref 8.5–10.1)
CHLORIDE SERPL-SCNC: 107 MMOL/L (ref 100–111)
CHLORIDE SERPL-SCNC: 109 MMOL/L (ref 100–111)
CHOLEST SERPL-MCNC: 199 MG/DL
CO2 SERPL-SCNC: 21 MMOL/L (ref 21–32)
CO2 SERPL-SCNC: 21 MMOL/L (ref 21–32)
CREAT SERPL-MCNC: 1.06 MG/DL (ref 0.6–1.3)
CREAT SERPL-MCNC: 1.09 MG/DL (ref 0.6–1.3)
DATE LAST DOSE: ABNORMAL
GLUCOSE BLD STRIP.AUTO-MCNC: 212 MG/DL (ref 70–110)
GLUCOSE BLD STRIP.AUTO-MCNC: 256 MG/DL (ref 70–110)
GLUCOSE BLD STRIP.AUTO-MCNC: 260 MG/DL (ref 70–110)
GLUCOSE SERPL-MCNC: 153 MG/DL (ref 74–99)
GLUCOSE SERPL-MCNC: 260 MG/DL (ref 74–99)
HDLC SERPL-MCNC: 42 MG/DL (ref 40–60)
HDLC SERPL: 4.7 {RATIO} (ref 0–5)
LDLC SERPL CALC-MCNC: 130 MG/DL (ref 0–100)
LIPID PROFILE,FLP: ABNORMAL
MAGNESIUM SERPL-MCNC: 2 MG/DL (ref 1.6–2.6)
PHOSPHATE SERPL-MCNC: 1.3 MG/DL (ref 2.5–4.9)
POTASSIUM SERPL-SCNC: 2.9 MMOL/L (ref 3.5–5.5)
POTASSIUM SERPL-SCNC: 3.7 MMOL/L (ref 3.5–5.5)
REPORTED DOSE,DOSE: ABNORMAL UNITS
REPORTED DOSE/TIME,TMG: 0
SODIUM SERPL-SCNC: 138 MMOL/L (ref 136–145)
SODIUM SERPL-SCNC: 142 MMOL/L (ref 136–145)
TRIGL SERPL-MCNC: 135 MG/DL (ref ?–150)
VANCOMYCIN TROUGH SERPL-MCNC: 7.2 UG/ML (ref 10–20)
VLDLC SERPL CALC-MCNC: 27 MG/DL

## 2021-01-23 PROCEDURE — 80061 LIPID PANEL: CPT

## 2021-01-23 PROCEDURE — 74011000258 HC RX REV CODE- 258: Performed by: NURSE PRACTITIONER

## 2021-01-23 PROCEDURE — 82962 GLUCOSE BLOOD TEST: CPT

## 2021-01-23 PROCEDURE — 80048 BASIC METABOLIC PNL TOTAL CA: CPT

## 2021-01-23 PROCEDURE — 84100 ASSAY OF PHOSPHORUS: CPT

## 2021-01-23 PROCEDURE — 74011250637 HC RX REV CODE- 250/637: Performed by: INTERNAL MEDICINE

## 2021-01-23 PROCEDURE — 2709999900 HC NON-CHARGEABLE SUPPLY

## 2021-01-23 PROCEDURE — 74011250637 HC RX REV CODE- 250/637: Performed by: NURSE PRACTITIONER

## 2021-01-23 PROCEDURE — 99239 HOSP IP/OBS DSCHRG MGMT >30: CPT | Performed by: INTERNAL MEDICINE

## 2021-01-23 PROCEDURE — 77030040392 HC DRSG OPTIFOAM MDII -A

## 2021-01-23 PROCEDURE — 83735 ASSAY OF MAGNESIUM: CPT

## 2021-01-23 PROCEDURE — 74011250636 HC RX REV CODE- 250/636: Performed by: INTERNAL MEDICINE

## 2021-01-23 PROCEDURE — 74011636637 HC RX REV CODE- 636/637: Performed by: INTERNAL MEDICINE

## 2021-01-23 PROCEDURE — 36415 COLL VENOUS BLD VENIPUNCTURE: CPT

## 2021-01-23 PROCEDURE — 80202 ASSAY OF VANCOMYCIN: CPT

## 2021-01-23 PROCEDURE — 74011250636 HC RX REV CODE- 250/636: Performed by: NURSE PRACTITIONER

## 2021-01-23 RX ORDER — POTASSIUM CHLORIDE 20 MEQ/1
40 TABLET, EXTENDED RELEASE ORAL 3 TIMES DAILY
Status: COMPLETED | OUTPATIENT
Start: 2021-01-23 | End: 2021-01-23

## 2021-01-23 RX ORDER — INSULIN GLARGINE 100 [IU]/ML
22 INJECTION, SOLUTION SUBCUTANEOUS DAILY
Qty: 5 ADJUSTABLE DOSE PRE-FILLED PEN SYRINGE | Refills: 3 | Status: SHIPPED | OUTPATIENT
Start: 2021-01-23

## 2021-01-23 RX ORDER — PEN NEEDLE, DIABETIC 30 GX3/16"
NEEDLE, DISPOSABLE MISCELLANEOUS
Qty: 100 PEN NEEDLE | Refills: 11 | Status: SHIPPED | OUTPATIENT
Start: 2021-01-23

## 2021-01-23 RX ORDER — BLOOD SUGAR DIAGNOSTIC
STRIP MISCELLANEOUS
Qty: 100 STRIP | Refills: 0 | Status: SHIPPED | OUTPATIENT
Start: 2021-01-23

## 2021-01-23 RX ORDER — METFORMIN HYDROCHLORIDE 850 MG/1
850 TABLET ORAL 2 TIMES DAILY WITH MEALS
Status: DISCONTINUED | OUTPATIENT
Start: 2021-01-23 | End: 2021-01-23

## 2021-01-23 RX ORDER — LANCETS
EACH MISCELLANEOUS
Qty: 100 EACH | Refills: 11 | Status: SHIPPED | OUTPATIENT
Start: 2021-01-23

## 2021-01-23 RX ORDER — ATORVASTATIN CALCIUM 10 MG/1
10 TABLET, FILM COATED ORAL
Qty: 30 TAB | Refills: 4 | Status: SHIPPED | OUTPATIENT
Start: 2021-01-23

## 2021-01-23 RX ORDER — SODIUM,POTASSIUM PHOSPHATES 280-250MG
1 POWDER IN PACKET (EA) ORAL 4 TIMES DAILY
Status: DISCONTINUED | OUTPATIENT
Start: 2021-01-23 | End: 2021-01-23 | Stop reason: HOSPADM

## 2021-01-23 RX ORDER — INSULIN GLARGINE 100 [IU]/ML
20 INJECTION, SOLUTION SUBCUTANEOUS DAILY
Status: DISCONTINUED | OUTPATIENT
Start: 2021-01-24 | End: 2021-01-23 | Stop reason: HOSPADM

## 2021-01-23 RX ADMIN — INSULIN LISPRO 4 UNITS: 100 INJECTION, SOLUTION INTRAVENOUS; SUBCUTANEOUS at 16:32

## 2021-01-23 RX ADMIN — POTASSIUM CHLORIDE 40 MEQ: 1500 TABLET, EXTENDED RELEASE ORAL at 04:39

## 2021-01-23 RX ADMIN — POTASSIUM CHLORIDE 40 MEQ: 1500 TABLET, EXTENDED RELEASE ORAL at 16:32

## 2021-01-23 RX ADMIN — FAMOTIDINE 20 MG: 20 TABLET, FILM COATED ORAL at 08:21

## 2021-01-23 RX ADMIN — INSULIN LISPRO 6 UNITS: 100 INJECTION, SOLUTION INTRAVENOUS; SUBCUTANEOUS at 08:23

## 2021-01-23 RX ADMIN — POTASSIUM & SODIUM PHOSPHATES POWDER PACK 280-160-250 MG 1 PACKET: 280-160-250 PACK at 12:49

## 2021-01-23 RX ADMIN — INSULIN GLARGINE 15 UNITS: 100 INJECTION, SOLUTION SUBCUTANEOUS at 08:22

## 2021-01-23 RX ADMIN — PIPERACILLIN AND TAZOBACTAM 3.38 G: 3; .375 INJECTION, POWDER, LYOPHILIZED, FOR SOLUTION INTRAVENOUS at 03:25

## 2021-01-23 RX ADMIN — POTASSIUM & SODIUM PHOSPHATES POWDER PACK 280-160-250 MG 1 PACKET: 280-160-250 PACK at 12:55

## 2021-01-23 RX ADMIN — INSULIN LISPRO 6 UNITS: 100 INJECTION, SOLUTION INTRAVENOUS; SUBCUTANEOUS at 12:48

## 2021-01-23 RX ADMIN — ENOXAPARIN SODIUM 40 MG: 40 INJECTION SUBCUTANEOUS at 12:48

## 2021-01-23 RX ADMIN — POTASSIUM CHLORIDE 40 MEQ: 1500 TABLET, EXTENDED RELEASE ORAL at 08:21

## 2021-01-23 RX ADMIN — Medication 400 MG: at 08:21

## 2021-01-23 RX ADMIN — ASPIRIN 81 MG: 81 TABLET, COATED ORAL at 08:21

## 2021-01-23 RX ADMIN — PIPERACILLIN AND TAZOBACTAM 3.38 G: 3; .375 INJECTION, POWDER, LYOPHILIZED, FOR SOLUTION INTRAVENOUS at 08:19

## 2021-01-23 RX ADMIN — VANCOMYCIN HYDROCHLORIDE 1250 MG: 10 INJECTION, POWDER, LYOPHILIZED, FOR SOLUTION INTRAVENOUS at 00:15

## 2021-01-23 NOTE — PROGRESS NOTES
Problem: Diabetes Self-Management  Goal: *Disease process and treatment process  Description: Define diabetes and identify own type of diabetes; list 3 options for treating diabetes. Outcome: Resolved/Met  Goal: *Incorporating nutritional management into lifestyle  Description: Describe effect of type, amount and timing of food on blood glucose; list 3 methods for planning meals. Outcome: Resolved/Met  Goal: *Incorporating physical activity into lifestyle  Description: State effect of exercise on blood glucose levels. Outcome: Resolved/Met  Goal: *Developing strategies to promote health/change behavior  Description: Define the ABC's of diabetes; identify appropriate screenings, schedule and personal plan for screenings. Outcome: Resolved/Met  Goal: *Using medications safely  Description: State effect of diabetes medications on diabetes; name diabetes medication taking, action and side effects. Outcome: Resolved/Met  Goal: *Monitoring blood glucose, interpreting and using results  Description: Identify recommended blood glucose targets  and personal targets. Outcome: Resolved/Met  Goal: *Prevention, detection, treatment of acute complications  Description: List symptoms of hyper- and hypoglycemia; describe how to treat low blood sugar and actions for lowering  high blood glucose level. Outcome: Resolved/Met  Goal: *Prevention, detection and treatment of chronic complications  Description: Define the natural course of diabetes and describe the relationship of blood glucose levels to long term complications of diabetes.   Outcome: Resolved/Met  Goal: *Developing strategies to address psychosocial issues  Description: Describe feelings about living with diabetes; identify support needed and support network  Outcome: Resolved/Met  Goal: *Insulin pump training  Outcome: Resolved/Met  Goal: *Sick day guidelines  Outcome: Resolved/Met  Goal: *Patient Specific Goal (EDIT GOAL, INSERT TEXT)  Outcome: Resolved/Met Problem: Patient Education: Go to Patient Education Activity  Goal: Patient/Family Education  Outcome: Resolved/Met     Problem: Falls - Risk of  Goal: *Absence of Falls  Description: Document Aaron Laughlin Fall Risk and appropriate interventions in the flowsheet. Outcome: Resolved/Met     Problem: Patient Education: Go to Patient Education Activity  Goal: Patient/Family Education  Outcome: Resolved/Met     Problem: Pressure Injury - Risk of  Goal: *Prevention of pressure injury  Description: Document Jan Scale and appropriate interventions in the flowsheet.   Outcome: Resolved/Met     Problem: Patient Education: Go to Patient Education Activity  Goal: Patient/Family Education  Outcome: Resolved/Met

## 2021-01-23 NOTE — PROGRESS NOTES
Bedside and Verbal shift change report given to [address] (oncoming nurse) by Chasidy Willis (offgoing nurse). Report included the following information SBAR, Kardex, Intake/Output and Cardiac Rhythm NSR.

## 2021-01-23 NOTE — DISCHARGE INSTRUCTIONS
Patient Education        Diabetic Ketoacidosis (DKA): Care Instructions  Your Care Instructions  Diabetic ketoacidosis (DKA) happens when the body does not have enough insulin and can't get the sugar it needs for energy. When the body can't use sugar for energy, it starts to use fat for energy. This process makes fatty acids called ketones. The ketones build up in the blood and change the chemical balance in your body. This problem can be very dangerous and needs to be treated. Without treatment, it can lead to a coma or death. DKA occurs most often in people with type 1 diabetes. But people with type 2 diabetes also can get it. DKA can be caused by many things. It can happen if you don't take enough insulin. It can also happen if you have an infection or illness like the flu. Sometimes it happens if you are very dehydrated. DKA can only be treated with insulin and fluids. These are often given in a vein (IV). Follow-up care is a key part of your treatment and safety. Be sure to make and go to all appointments, and call your doctor if you are having problems. It's also a good idea to know your test results and keep a list of the medicines you take. How can you care for yourself at home? To reduce your chance of ketoacidosis:  · Take your insulin and other diabetes medicines on time and in the right dose. ? If an infection caused your DKA and your doctor prescribed antibiotics, take them as directed. Do not stop taking them just because you feel better. You need to take the full course of antibiotics. · Test your blood sugar before meals and at bedtime or as often as your doctor advises. This is the best way to know when your blood sugar is high so you can treat it early. Watching for symptoms is not as helpful. This is because you may not have symptoms until your blood sugar is very high. Or you may not notice them. · Teach others at work and at home how to check your blood sugar.  Make sure that someone else knows how do it in case you can't. · Wear or carry medical identification at all times. This is very important in case you are too sick or injured to speak for yourself. · Talk to your doctor about when you can start to exercise again. · Eat regular meals that spread your calories and carbohydrate throughout the day. This will help keep your blood sugar steady. · When you are sick:  ? Take your insulin and diabetes medicines. This is important even if you are vomiting and having trouble eating or drinking. Your blood sugar may go up because you are sick. If you are eating less than normal, you may need to change your dose of insulin. Talk with your doctor about a plan when you are well. Then you will know what to do when you are sick. ? Drink extra fluids to prevent dehydration. These include water, broth, and sugar-free drinks. If you don't drink enough, the insulin from your shot may not get into your blood. So your blood sugar may go up. ? Try to eat as you normally do, with a focus on healthy food choices. ? Check your blood sugar at least every 3 to 4 hours. Check it more often if it's rising fast. If your doctor has told you to take an extra insulin dose for high blood sugar levels (for example, above 240 mg/dL) be sure to take the right amount. If you're not sure how much to take, call your doctor. ? Check your temperature and pulse often. If your temperature goes up, call your doctor. You may be getting worse. ? If you take insulin, check your urine or blood for ketones, especially when you have high blood sugar (for example, above 240 mg/dL). Call your doctor if your ketone level is moderate or high. If you know your blood sugar is high, treat it before it gets worse. · If you missed your usual dose of insulin or other diabetes medicine, take the missed dose or take the amount your doctor told you to take if this happens.   · If you and your doctor decide on a dose of extra-fast-acting insulin, give yourself the right dose. If you take insulin and your doctor has not told you how much fast-acting insulin to take based on your blood sugar level, call your doctor. · Drink extra water or sugar-free drinks to prevent dehydration. · Wait 30 minutes after you take extra insulin or missed medicines. Then check your blood sugar again. · If symptoms of high blood sugar get worse or your blood sugar level keeps rising, call your doctor. If you start to feel sleepy or confused, call 911. When should you call for help? Call 911 anytime you think you may need emergency care. For example, call if:    · You passed out (lost consciousness).     · You are confused or cannot think clearly.     · Your blood sugar is very high or very low. Watch closely for changes in your health, and be sure to contact your doctor if:    · Your blood sugar stays outside the level your doctor set for you.     · You have any problems. Where can you learn more? Go to http://www.gray.com/  Enter [id number] in the search box to learn more about \"Diabetic Ketoacidosis (DKA): Care Instructions. \"  Current as of: December 20, 2019               Content Version: 12.6  © 2559-4676 [employer]. Care instructions adapted under license by [employer] (which disclaims liability or warranty for this information). If you have questions about a medical condition or this instruction, always ask your healthcare professional. [place] [address] any warranty or liability for your use of this information. Discharge Instructions    P  Patient Education        Abdominal Pain: Care Instructions  Your Care Instructions     Abdominal pain has many possible causes. Some aren't serious and get better on their own in a few days. Others need more testing and treatment. If your pain continues or gets worse, you need to be rechecked and may need more tests to find out what is wrong.  You may need surgery to correct the problem. Don't ignore new symptoms, such as fever, nausea and vomiting, urination problems, pain that gets worse, and dizziness. These may be signs of a more serious problem. Your doctor may have recommended a follow-up visit in the next 8 to 12 hours. If you are not getting better, you may need more tests or treatment. The doctor has checked you carefully, but problems can develop later. If you notice any problems or new symptoms, get medical treatment right away. Follow-up care is a key part of your treatment and safety. Be sure to make and go to all appointments, and call your doctor if you are having problems. It's also a good idea to know your test results and keep a list of the medicines you take. How can you care for yourself at home? · Rest until you feel better. · To prevent dehydration, drink plenty of fluids, enough so that your urine is light yellow or clear like water. Choose water and other caffeine-free clear liquids until you feel better. If you have kidney, heart, or liver disease and have to limit fluids, talk with your doctor before you increase the amount of fluids you drink. · If your stomach is upset, eat mild foods, such as rice, dry toast or crackers, bananas, and applesauce. Try eating several small meals instead of two or three large ones. · Wait until 48 hours after all symptoms have gone away before you have spicy foods, alcohol, and drinks that contain caffeine. · Do not eat foods that are high in fat. · Avoid anti-inflammatory medicines such as aspirin, ibuprofen (Advil, Motrin), and naproxen (Aleve). These can cause stomach upset. Talk to your doctor if you take daily aspirin for another health problem. When should you call for help? Call 911 anytime you think you may need emergency care.  For example, call if:    · You passed out (lost consciousness).     · You pass maroon or very bloody stools.     · You vomit blood or what looks like coffee grounds.     · You have new, severe belly pain. Call your doctor now or seek immediate medical care if:    · Your pain gets worse, especially if it becomes focused in one area of your belly.     · You have a new or higher fever.     · Your stools are black and look like tar, or they have streaks of blood.     · You have unexpected vaginal bleeding.     · You have symptoms of a urinary tract infection. These may include:  ? Pain when you urinate. ? Urinating more often than usual.  ? Blood in your urine.     · You are dizzy or lightheaded, or you feel like you may faint. Watch closely for changes in your health, and be sure to contact your doctor if:    · You are not getting better after 1 day (24 hours). Where can you learn more? Go to http://www.gray.com/  Enter T417 in the search box to learn more about \"Abdominal Pain: Care Instructions. \"  Current as of: June 26, 2019               Content Version: 12.6  © 5865-0080 [employer]. Care instructions adapted under license by [employer] (which disclaims liability or warranty for this information). If you have questions about a medical condition or this instruction, always ask your healthcare professional. [place] [address] any warranty or liability for your use of this information. Felix Villeda MRN: [id number]  CSN: [id number]    YOB: 1975  Age: 39 y.o. Sex: male    DOA: 1/20/2021 LOS:  LOS: 2 days   Discharge Date:      ACUTE DIAGNOSES:  1.  Diabetic Ketoacidosis in type 2 Diabetes mellitus   2. Lactic acidosis due to DKA, resolved   3. SIRS likely due to DKA without organ failure, resolved        No evidence of infection   4. Leukocytosis, resolved    5. Epigastric, nausea, vomiting, resolved   6. Acute renal insufficiency, prerenal, resolved   7. Hyponatremia, resolved   8. Hypophosphatemia, replaced   9.   Hypokalemia, replaced DISCHARGE MEDICATIONS:         · It is important that you take the medication exactly as they are prescribed. · Keep your medication in the bottles provided by the pharmacist and keep a list of the medication names, dosages, and times to be taken in your wallet. · Do not take other medications without consulting your doctor. DIET:  Cardiac Diet, Diabetic Diet and Low fat, Low cholesterol    ACTIVITY: Activity as tolerated  Monitor your blood sugar 3 time daily, please record record and present to your PCP for further adjustment of insulin  Refer to your diabetic education package for further care     ADDITIONAL INFORMATION: If you experience any of the following symptoms then please call your primary care physician or return to the emergency room if you cannot get hold of your doctor: Fever, chills, nausea, vomiting, diarrhea, change in mentation, falling, bleeding, shortness of breath. FOLLOW UP CARE:  Primary Care Physician, you are to call and set up an appointment to see them in 1-2 weeks. Information obtained by :  I understand that if any problems occur once I am at home I am to contact my physician. I understand and acknowledge receipt of the instructions indicated above.                                                                                                                                            Physician's or R.N.'s Signature                                                                  Date/Time                                                                                                                                              Patient or Representative Signature                                                          Date/Time    Shahab Webb MD  1/23/2021  12:04 PM

## 2021-01-23 NOTE — DISCHARGE SUMMARY
Discharge Summary    Patient: Marlen Richter               Sex: male          DOA: 1/20/2021         YOB: 1975      Age:  39 y.o.        LOS:  LOS: 2 days                Admit Date: 1/20/2021    Discharge Date: 1/23/2021    Primary care physician: None    Discharge Diagnoses:    1. Diabetic Ketoacidosis in type 2 Diabetes mellitus   2. Lactic acidosis due to DKA, resolved   3. SIRS likely due to DKA without organ failure, resolved        No evidence of infection   4. Leukocytosis, resolved    5. Epigastric, nausea, vomiting, resolved   6. Acute renal insufficiency, prerenal, resolved   7. Hyponatremia, resolved   8. Hypophosphatemia, replaced   9. Hypokalemia, replaced           Discharge Condition: Good  Disposition: home  Code Status: full code     Follow up for Primary Care Physician:  1) he has been started on Lantus, please monitor his FSBS and HgbA1c for further adjustment of his dosing. He is not able to tolerate metformin  He needs annual diabetic care including eye exam and renal protein monitor   2) he is started on atorvastatin for mild risk management, please consider ASA for secondary prevention outpatient       Hospital Course:   39 y.o male with morbid obesity, newly diagnosed type 2 DM presented to ER with epigastric pain, nausea/vomiting after he was started on metformin for his newly diagnosed T2DM. He expressed polyuria and polydipsia days prior to his T2DM diagnosis. No fever or chil at home. In the ER, he was found to have acute metabolic acidosis with anion gap, lactic acidosis, SIRS, new RYAN for which he was started on IV fluid and IV insulin gtt for treatment of DKA in the setting of uncontrolled type 2DM. He was initially admitted to ICU. He was also on sepsis protocol due to SIRS concern. Sepsis was ruled out as there was no evidence of infection and his SIRS was related to his DKA. He was stable and transitioned out of ICU on 1/21/21 which closed gap.  He was started on lantus and advanced in his diet. Last 24 Hours: his nausea/vomiting has resolved and has tolerated oral diet. No stomach pain. No diarrhea, no fever. His blood culture without growth. His antibiotics has been stopped this AM.   His electrolyte corrected. His anion gap has closed and he has tolerated subcutaneous Lantus. He has received diabetic education and how to monitor his daily FSBS. He declined to resume Metformin     ROS: No current fever/chills, no headache, no dizziness, no facial pain, no sinus congestion,   No swallowing pain, No chest pain, no palpitation, no shortness of breath, no abd pain,  No diarrhea, no urinary complaint, no leg pain or swelling    VS:   Visit Vitals  BP (!) 147/91   Pulse (!) 104   Temp 98.3 °F (36.8 °C)   Resp 18   Ht 5' 7\" (1.702 m)   Wt 102.1 kg (225 lb)   SpO2 99%   BMI 35.24 kg/m²      Tmax/24hrs: Temp (24hrs), Av.5 °F (36.9 °C), Min:97.6 °F (36.4 °C), Max:98.9 °F (37.2 °C)      Intake/Output Summary (Last 24 hours) at 2021 1210  Last data filed at 2021 2585  Gross per 24 hour   Intake 1531.64 ml   Output 2500 ml   Net -968.36 ml     Tele:   General:  Cooperative, Not in acute distress, speaks in full sentence while in bed  HEENT: PERRL, EOMI, supple neck, no JVD, dry oral mucosa  Cardiovascular: S1S2 regular, no rub/gallop   Pulmonary: Clear air entry bilaterally, no wheezing, no crackle  GI:  Soft, non tender, non distended, +bs, no guarding   Extremities:  No pedal edema, +distal pulses appreciated   Neuro: AOx3, moving all extremities, no gross deficit. Consults:   Intensivist: dr. Dmitriy Lopez Diagnostic Studies:   XR Results (most recent):  Results from Hospital Encounter encounter on 21   XR ABD (KUB)    Narrative EXAM: XR ABD (KUB)    INDICATION: distention, abdominal pain. Abdominal pain and nausea. Recently  diagnosed with diabetes. COMPARISON: None.     TECHNIQUE: One view of the abdomen was obtained    FINDINGS:  Gas is seen throughout the small bowel and colon, without significantly  distended bowel loops to suggest obstruction. Evaluation for free air is limited  on this supine radiograph, but no definite free air is seen. No acute osseous  abnormality. Impression Nonspecific bowel gas pattern. No definite evidence for obstruction. Lab/Data Review:  Labs: Results:       Chemistry Recent Labs     01/23/21  0106 01/22/21  1928 01/22/21  1029 01/22/21  0305 01/21/21 2120 01/20/21 2327 01/20/21 2327   * 219* 149* 157* 135*   < > 404*    139 139 135* 136   < > 125*   K 2.9* 3.6 3.0* 3.0* 3.5   < > 4.7    108 108 109 107   < > 91*   CO2 21 17* 18* 15* 16*   < > 7*   BUN 4* 4* 4* 6* 7   < > 18   CREA 1.06 1.11 1.07 1.18 1.29   < > 1.86*   CA 8.0* 8.0* 7.8* 7.6* 7.5*   < > 9.1   AGAP 12 14 13 11 13   < > 27*   BUCR 4* 4* 4* 5* 5*   < > 10*   AP  --   --   --   --   --   --  95   TP  --   --   --   --   --   --  10.1*   ALB  --   --  2.9* 2.8* 3.3*   < > 4.4   GLOB  --   --   --   --   --   --  5.7*   AGRAT  --   --   --   --   --   --  0.8    < > = values in this interval not displayed. CBC w/Diff Recent Labs     01/22/21 0305 01/21/21 2120 01/20/21 2327   WBC 9.3 11.3 20.5*   RBC 4.58* 5.26 6.52*   HGB 12.2* 14.2 17.4*   HCT 33.3* 38.5 48.1*   * 148 191   GRANS 67 69 85*   LYMPH 24 20 5*   EOS 0 0 0      Coagulation No results for input(s): PTP, INR, APTT, INREXT in the last 72 hours. Iron/Ferritin No results for input(s): IRON in the last 72 hours. No lab exists for component: TIBCCALC   BNP No results for input(s): BNPP in the last 72 hours. Cardiac Enzymes Recent Labs     01/21/21  0940      CKND1 0.4      Liver Enzymes Recent Labs     01/22/21  1029 01/20/21  2327 01/20/21 2327   TP  --   --  10.1*   ALB 2.9*   < > 4.4   AP  --   --  95    < > = values in this interval not displayed.       Thyroid Studies No results for input(s): T4, T3U, TSH, TSHEXT in the last 72 hours. No lab exists for component: T3RU       All Micro Results     Procedure Component Value Units Date/Time    CULTURE, BLOOD [[id number]] Collected: 01/20/21 2327    Order Status: Completed Specimen: Blood Updated: 01/23/21 0706     Special Requests: --        LEFT  Antecubital       Culture result: NO GROWTH 2 DAYS       CULTURE, BLOOD [[id number]] Collected: 01/20/21 2340    Order Status: Completed Specimen: Blood Updated: 01/23/21 0706     Special Requests: --        NO SPECIAL REQUESTS  LEFT  Antecubital       Culture result: NO GROWTH 2 DAYS       RESPIRATORY VIRUS PANEL W/COVID-19, PCR [[id number]] Collected: 01/21/21 1013    Order Status: Completed Specimen: Nasopharyngeal Updated: 01/21/21 1215     Adenovirus Not detected        Coronavirus 229E Not detected        Coronavirus HKU1 Not detected        Coronavirus CVNL63 Not detected        Coronavirus OC43 Not detected        Metapneumovirus Not detected        Rhinovirus and Enterovirus Not detected        Influenza A Not detected        Influenza B Not detected        Parainfluenza 1 Not detected        Parainfluenza 2 Not detected        Parainfluenza 3 Not detected        Parainfluenza virus 4 Not detected        RSV by PCR Not detected        B. parapertussis, PCR Not detected        Bordetella pertussis - PCR Not detected        Chlamydophila pneumoniae DNA, QL, PCR Not detected        Mycoplasma pneumoniae DNA, QL, PCR Not detected        SARS-CoV-2, PCR Not detected                   Medications at discharge  including reasons for change and indications for new ones:   Current Discharge Medication List      START taking these medications    Details   atorvastatin (LIPITOR) 10 mg tablet Take 1 Tab by mouth nightly.  Indications: hardening of the arteries due to plaque buildup, excessive fat in the blood  Qty: 30 Tab, Refills: 4      insulin glargine (LANTUS,BASAGLAR) 100 unit/mL (3 mL) inpn 22 Units by SubCUTAneous route daily.  Indications: type 2 diabetes mellitus  Qty: 5 Adjustable Dose Pre-filled Pen Syringe, Refills: 3      Insulin Needles, Disposable, 31 gauge x 5/16\" ndle Take 1 needle for insulin injection daily  Qty: 100 Pen Needle, Refills: 11      glucose blood VI test strips (Contour Next Test Strips) strip Take 1 strip for sugar testing 3 times daily  Qty: 100 Strip, Refills: 0      lancets misc Take 1 lancet for sugar testing 3 times daily  Qty: 100 Each, Refills: 11         STOP taking these medications       metFORMIN (GLUCOPHAGE) 500 mg tablet Comments:   Reason for Stopping:                       Pending laboratory work and tests: final blood culture     Activity: Activity as tolerated    Diet: Cardiac Diet, Diabetic Diet and Low fat, Low cholesterol    Wound Care: None needed        Jose Daniel MD  1/23/2021  12:10 PM

## 2021-01-23 NOTE — PROGRESS NOTES
D/C order noted for today. Orders reviewed. No needs identified at this time. CM remains available if needed.  Nya Wilson, [phone]

## 2021-01-27 LAB
BACTERIA SPEC CULT: NORMAL
BACTERIA SPEC CULT: NORMAL
INSULIN AB SER-ACNC: 5.3 UU/ML
SERVICE CMNT-IMP: NORMAL
SERVICE CMNT-IMP: NORMAL

## 2021-01-30 LAB — ISLET CELL512 AB SER-ACNC: <7.5 U/ML
